# Patient Record
Sex: MALE | Race: WHITE | NOT HISPANIC OR LATINO | Employment: PART TIME | URBAN - METROPOLITAN AREA
[De-identification: names, ages, dates, MRNs, and addresses within clinical notes are randomized per-mention and may not be internally consistent; named-entity substitution may affect disease eponyms.]

---

## 2019-01-27 ENCOUNTER — HOSPITAL ENCOUNTER (EMERGENCY)
Facility: HOSPITAL | Age: 49
Discharge: PSYCHIATRIC OR SUBSTANCE USE FACILITY | End: 2019-01-27
Attending: EMERGENCY MEDICINE
Payer: COMMERCIAL

## 2019-01-27 ENCOUNTER — HOSPITAL ENCOUNTER (INPATIENT)
Facility: HOSPITAL | Age: 49
LOS: 7 days | Discharge: HOME | DRG: 885 | End: 2019-02-03
Attending: PSYCHIATRY & NEUROLOGY | Admitting: PSYCHIATRY & NEUROLOGY
Payer: COMMERCIAL

## 2019-01-27 VITALS
TEMPERATURE: 99 F | SYSTOLIC BLOOD PRESSURE: 143 MMHG | BODY MASS INDEX: 38.43 KG/M2 | RESPIRATION RATE: 18 BRPM | HEART RATE: 96 BPM | OXYGEN SATURATION: 100 % | WEIGHT: 275.56 LBS | DIASTOLIC BLOOD PRESSURE: 93 MMHG

## 2019-01-27 DIAGNOSIS — R45.851 DEPRESSION WITH SUICIDAL IDEATION: ICD-10-CM

## 2019-01-27 DIAGNOSIS — Z13.9 ENCOUNTER FOR MEDICAL SCREENING EXAMINATION: ICD-10-CM

## 2019-01-27 DIAGNOSIS — R45.851 SUICIDAL IDEATION: Primary | ICD-10-CM

## 2019-01-27 DIAGNOSIS — F33.2 SEVERE EPISODE OF RECURRENT MAJOR DEPRESSIVE DISORDER, WITHOUT PSYCHOTIC FEATURES: Primary | ICD-10-CM

## 2019-01-27 DIAGNOSIS — F32.A DEPRESSION WITH SUICIDAL IDEATION: ICD-10-CM

## 2019-01-27 PROBLEM — F43.10 PTSD (POST-TRAUMATIC STRESS DISORDER): Status: ACTIVE | Noted: 2019-01-27

## 2019-01-27 PROBLEM — F45.42 PAIN DISORDER ASSOCIATED WITH PSYCHOLOGICAL AND PHYSICAL FACTORS: Status: ACTIVE | Noted: 2019-01-27

## 2019-01-27 PROBLEM — I10 HTN (HYPERTENSION): Status: ACTIVE | Noted: 2019-01-27

## 2019-01-27 PROBLEM — E11.9 DM (DIABETES MELLITUS): Status: ACTIVE | Noted: 2019-01-27

## 2019-01-27 PROBLEM — G44.209 TENSION TYPE HEADACHE: Status: ACTIVE | Noted: 2019-01-27

## 2019-01-27 PROBLEM — E78.5 DYSLIPIDEMIA: Status: ACTIVE | Noted: 2019-01-27

## 2019-01-27 LAB
ALBUMIN SERPL BCP-MCNC: 4.5 G/DL
ALP SERPL-CCNC: 59 U/L
ALT SERPL W/O P-5'-P-CCNC: 37 U/L
AMPHET+METHAMPHET UR QL: NEGATIVE
ANION GAP SERPL CALC-SCNC: 13 MMOL/L
APAP SERPL-MCNC: <3 UG/ML
AST SERPL-CCNC: 26 U/L
BACTERIA #/AREA URNS HPF: NORMAL /HPF
BARBITURATES UR QL SCN>200 NG/ML: NEGATIVE
BASOPHILS # BLD AUTO: 0.02 K/UL
BASOPHILS NFR BLD: 0.3 %
BENZODIAZ UR QL SCN>200 NG/ML: NEGATIVE
BILIRUB SERPL-MCNC: 0.6 MG/DL
BILIRUB UR QL STRIP: NEGATIVE
BUN SERPL-MCNC: 13 MG/DL
BZE UR QL SCN: NEGATIVE
CALCIUM SERPL-MCNC: 10.3 MG/DL
CANNABINOIDS UR QL SCN: NORMAL
CHLORIDE SERPL-SCNC: 104 MMOL/L
CLARITY UR: CLEAR
CO2 SERPL-SCNC: 22 MMOL/L
COLOR UR: YELLOW
CREAT SERPL-MCNC: 0.9 MG/DL
CREAT UR-MCNC: 50.6 MG/DL
DIFFERENTIAL METHOD: ABNORMAL
EOSINOPHIL # BLD AUTO: 0.1 K/UL
EOSINOPHIL NFR BLD: 1.3 %
ERYTHROCYTE [DISTWIDTH] IN BLOOD BY AUTOMATED COUNT: 13.6 %
EST. GFR  (AFRICAN AMERICAN): >60 ML/MIN/1.73 M^2
EST. GFR  (NON AFRICAN AMERICAN): >60 ML/MIN/1.73 M^2
ETHANOL SERPL-MCNC: <10 MG/DL
GLUCOSE SERPL-MCNC: 150 MG/DL
GLUCOSE UR QL STRIP: ABNORMAL
HCT VFR BLD AUTO: 46.4 %
HGB BLD-MCNC: 15.9 G/DL
HGB UR QL STRIP: NEGATIVE
KETONES UR QL STRIP: NEGATIVE
LEUKOCYTE ESTERASE UR QL STRIP: NEGATIVE
LYMPHOCYTES # BLD AUTO: 1.3 K/UL
LYMPHOCYTES NFR BLD: 20.3 %
MCH RBC QN AUTO: 31.5 PG
MCHC RBC AUTO-ENTMCNC: 34.3 G/DL
MCV RBC AUTO: 92 FL
METHADONE UR QL SCN>300 NG/ML: NEGATIVE
MICROSCOPIC COMMENT: NORMAL
MONOCYTES # BLD AUTO: 0.5 K/UL
MONOCYTES NFR BLD: 7.8 %
NEUTROPHILS # BLD AUTO: 4.3 K/UL
NEUTROPHILS NFR BLD: 70.3 %
NITRITE UR QL STRIP: NEGATIVE
OPIATES UR QL SCN: NEGATIVE
PCP UR QL SCN>25 NG/ML: NEGATIVE
PH UR STRIP: 5 [PH] (ref 5–8)
PLATELET # BLD AUTO: 223 K/UL
PMV BLD AUTO: 10 FL
POCT GLUCOSE: 128 MG/DL (ref 70–110)
POTASSIUM SERPL-SCNC: 3.8 MMOL/L
PROT SERPL-MCNC: 7.8 G/DL
PROT UR QL STRIP: NEGATIVE
RBC # BLD AUTO: 5.05 M/UL
RBC #/AREA URNS HPF: 0 /HPF (ref 0–4)
SALICYLATES SERPL-MCNC: <5 MG/DL
SODIUM SERPL-SCNC: 139 MMOL/L
SP GR UR STRIP: <=1.005 (ref 1–1.03)
TOXICOLOGY INFORMATION: NORMAL
TSH SERPL DL<=0.005 MIU/L-ACNC: 1.79 UIU/ML
URN SPEC COLLECT METH UR: ABNORMAL
UROBILINOGEN UR STRIP-ACNC: NEGATIVE EU/DL
WBC # BLD AUTO: 6.15 K/UL
WBC #/AREA URNS HPF: 0 /HPF (ref 0–5)
YEAST URNS QL MICRO: NORMAL

## 2019-01-27 PROCEDURE — 80307 DRUG TEST PRSMV CHEM ANLYZR: CPT

## 2019-01-27 PROCEDURE — 85025 COMPLETE CBC W/AUTO DIFF WBC: CPT

## 2019-01-27 PROCEDURE — 81000 URINALYSIS NONAUTO W/SCOPE: CPT | Mod: 59

## 2019-01-27 PROCEDURE — 36415 COLL VENOUS BLD VENIPUNCTURE: CPT

## 2019-01-27 PROCEDURE — 80053 COMPREHEN METABOLIC PANEL: CPT

## 2019-01-27 PROCEDURE — 99285 EMERGENCY DEPT VISIT HI MDM: CPT | Mod: 25

## 2019-01-27 PROCEDURE — 99223 1ST HOSP IP/OBS HIGH 75: CPT | Mod: ,,, | Performed by: PSYCHIATRY & NEUROLOGY

## 2019-01-27 PROCEDURE — 25000003 PHARM REV CODE 250: Performed by: PSYCHIATRY & NEUROLOGY

## 2019-01-27 PROCEDURE — 99232 SBSQ HOSP IP/OBS MODERATE 35: CPT | Mod: ,,, | Performed by: FAMILY MEDICINE

## 2019-01-27 PROCEDURE — 11400000 HC PSYCH PRIVATE ROOM

## 2019-01-27 PROCEDURE — 83036 HEMOGLOBIN GLYCOSYLATED A1C: CPT

## 2019-01-27 PROCEDURE — 93005 ELECTROCARDIOGRAM TRACING: CPT

## 2019-01-27 PROCEDURE — 99232 PR SUBSEQUENT HOSPITAL CARE,LEVL II: ICD-10-PCS | Mod: ,,, | Performed by: FAMILY MEDICINE

## 2019-01-27 PROCEDURE — 93010 ELECTROCARDIOGRAM REPORT: CPT | Mod: ,,, | Performed by: INTERNAL MEDICINE

## 2019-01-27 PROCEDURE — 80061 LIPID PANEL: CPT

## 2019-01-27 PROCEDURE — 80329 ANALGESICS NON-OPIOID 1 OR 2: CPT

## 2019-01-27 PROCEDURE — 99223 PR INITIAL HOSPITAL CARE,LEVL III: ICD-10-PCS | Mod: ,,, | Performed by: PSYCHIATRY & NEUROLOGY

## 2019-01-27 PROCEDURE — 90833 PR PSYCHOTHERAPY W/PATIENT W/E&M, 30 MIN (ADD ON): ICD-10-PCS | Mod: ,,, | Performed by: PSYCHIATRY & NEUROLOGY

## 2019-01-27 PROCEDURE — 90833 PSYTX W PT W E/M 30 MIN: CPT | Mod: ,,, | Performed by: PSYCHIATRY & NEUROLOGY

## 2019-01-27 PROCEDURE — 84443 ASSAY THYROID STIM HORMONE: CPT

## 2019-01-27 PROCEDURE — 93010 EKG 12-LEAD: ICD-10-PCS | Mod: ,,, | Performed by: INTERNAL MEDICINE

## 2019-01-27 PROCEDURE — 80320 DRUG SCREEN QUANTALCOHOLS: CPT

## 2019-01-27 RX ORDER — METOPROLOL SUCCINATE 25 MG/1
100 TABLET, EXTENDED RELEASE ORAL DAILY
Status: DISCONTINUED | OUTPATIENT
Start: 2019-01-28 | End: 2019-02-03 | Stop reason: HOSPADM

## 2019-01-27 RX ORDER — HYDROCHLOROTHIAZIDE 25 MG/1
25 TABLET ORAL DAILY
Status: DISCONTINUED | OUTPATIENT
Start: 2019-01-28 | End: 2019-02-03 | Stop reason: HOSPADM

## 2019-01-27 RX ORDER — SERTRALINE HYDROCHLORIDE 25 MG/1
25 TABLET, FILM COATED ORAL DAILY
Status: DISCONTINUED | OUTPATIENT
Start: 2019-01-27 | End: 2019-01-28

## 2019-01-27 RX ORDER — MAG HYDROX/ALUMINUM HYD/SIMETH 200-200-20
30 SUSPENSION, ORAL (FINAL DOSE FORM) ORAL EVERY 6 HOURS PRN
Status: DISCONTINUED | OUTPATIENT
Start: 2019-01-27 | End: 2019-02-03 | Stop reason: HOSPADM

## 2019-01-27 RX ORDER — GABAPENTIN 100 MG/1
100 CAPSULE ORAL 2 TIMES DAILY
Status: DISCONTINUED | OUTPATIENT
Start: 2019-01-27 | End: 2019-01-28

## 2019-01-27 RX ORDER — LOPERAMIDE HYDROCHLORIDE 2 MG/1
2 CAPSULE ORAL
Status: DISCONTINUED | OUTPATIENT
Start: 2019-01-27 | End: 2019-02-03 | Stop reason: HOSPADM

## 2019-01-27 RX ORDER — ACETAMINOPHEN 325 MG/1
650 TABLET ORAL EVERY 6 HOURS PRN
Status: DISCONTINUED | OUTPATIENT
Start: 2019-01-27 | End: 2019-02-03 | Stop reason: HOSPADM

## 2019-01-27 RX ORDER — GLUCAGON 1 MG
1 KIT INJECTION
Status: DISCONTINUED | OUTPATIENT
Start: 2019-01-27 | End: 2019-02-03 | Stop reason: HOSPADM

## 2019-01-27 RX ORDER — NITROGLYCERIN 0.4 MG/1
0.4 TABLET SUBLINGUAL EVERY 5 MIN PRN
Status: DISCONTINUED | OUTPATIENT
Start: 2019-01-27 | End: 2019-02-03 | Stop reason: HOSPADM

## 2019-01-27 RX ORDER — IBUPROFEN 200 MG
24 TABLET ORAL
Status: DISCONTINUED | OUTPATIENT
Start: 2019-01-27 | End: 2019-02-03 | Stop reason: HOSPADM

## 2019-01-27 RX ORDER — INSULIN ASPART 100 [IU]/ML
0-5 INJECTION, SOLUTION INTRAVENOUS; SUBCUTANEOUS
Status: DISCONTINUED | OUTPATIENT
Start: 2019-01-27 | End: 2019-02-03 | Stop reason: HOSPADM

## 2019-01-27 RX ORDER — QUINAPRIL 10 MG/1
20 TABLET ORAL NIGHTLY
Status: DISCONTINUED | OUTPATIENT
Start: 2019-01-27 | End: 2019-02-03 | Stop reason: HOSPADM

## 2019-01-27 RX ORDER — METFORMIN HYDROCHLORIDE 500 MG/1
500 TABLET ORAL
Status: DISCONTINUED | OUTPATIENT
Start: 2019-01-28 | End: 2019-02-03 | Stop reason: HOSPADM

## 2019-01-27 RX ORDER — FOLIC ACID 1 MG/1
1 TABLET ORAL DAILY
Status: DISCONTINUED | OUTPATIENT
Start: 2019-01-27 | End: 2019-02-03 | Stop reason: HOSPADM

## 2019-01-27 RX ORDER — DOCUSATE SODIUM 100 MG/1
100 CAPSULE, LIQUID FILLED ORAL DAILY PRN
Status: DISCONTINUED | OUTPATIENT
Start: 2019-01-27 | End: 2019-02-03 | Stop reason: HOSPADM

## 2019-01-27 RX ORDER — FENOFIBRATE 160 MG/1
160 TABLET ORAL DAILY
Status: DISCONTINUED | OUTPATIENT
Start: 2019-01-28 | End: 2019-01-27 | Stop reason: SDUPTHER

## 2019-01-27 RX ORDER — OLANZAPINE 10 MG/1
10 TABLET ORAL EVERY 4 HOURS PRN
Status: DISCONTINUED | OUTPATIENT
Start: 2019-01-27 | End: 2019-02-03 | Stop reason: HOSPADM

## 2019-01-27 RX ORDER — ATORVASTATIN CALCIUM 20 MG/1
40 TABLET, FILM COATED ORAL DAILY
Status: DISCONTINUED | OUTPATIENT
Start: 2019-01-28 | End: 2019-02-03 | Stop reason: HOSPADM

## 2019-01-27 RX ORDER — GEMFIBROZIL 600 MG/1
600 TABLET, FILM COATED ORAL
Status: DISCONTINUED | OUTPATIENT
Start: 2019-01-27 | End: 2019-02-03 | Stop reason: HOSPADM

## 2019-01-27 RX ORDER — IBUPROFEN 200 MG
16 TABLET ORAL
Status: DISCONTINUED | OUTPATIENT
Start: 2019-01-27 | End: 2019-02-03 | Stop reason: HOSPADM

## 2019-01-27 RX ORDER — ASPIRIN 81 MG/1
81 TABLET ORAL DAILY
Status: DISCONTINUED | OUTPATIENT
Start: 2019-01-27 | End: 2019-02-03 | Stop reason: HOSPADM

## 2019-01-27 RX ORDER — QUETIAPINE FUMARATE 100 MG/1
100 TABLET, FILM COATED ORAL NIGHTLY
Status: DISCONTINUED | OUTPATIENT
Start: 2019-01-27 | End: 2019-01-28

## 2019-01-27 RX ORDER — OLANZAPINE 10 MG/2ML
10 INJECTION, POWDER, FOR SOLUTION INTRAMUSCULAR EVERY 4 HOURS PRN
Status: DISCONTINUED | OUTPATIENT
Start: 2019-01-27 | End: 2019-02-03 | Stop reason: HOSPADM

## 2019-01-27 RX ORDER — HYDROXYZINE PAMOATE 50 MG/1
50 CAPSULE ORAL EVERY 6 HOURS PRN
Status: DISCONTINUED | OUTPATIENT
Start: 2019-01-27 | End: 2019-02-03 | Stop reason: HOSPADM

## 2019-01-27 RX ADMIN — QUINAPRIL 20 MG: 10 TABLET ORAL at 08:01

## 2019-01-27 RX ADMIN — GEMFIBROZIL 600 MG: 600 TABLET ORAL at 04:01

## 2019-01-27 RX ADMIN — THERA TABS 1 TABLET: TAB at 01:01

## 2019-01-27 RX ADMIN — SERTRALINE HYDROCHLORIDE 25 MG: 25 TABLET ORAL at 01:01

## 2019-01-27 RX ADMIN — GABAPENTIN 100 MG: 100 CAPSULE ORAL at 08:01

## 2019-01-27 RX ADMIN — QUETIAPINE FUMARATE 100 MG: 100 TABLET ORAL at 08:01

## 2019-01-27 RX ADMIN — FOLIC ACID 1 MG: 1 TABLET ORAL at 01:01

## 2019-01-27 RX ADMIN — ASPIRIN 81 MG: 81 TABLET, COATED ORAL at 01:01
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
## 2019-01-27 NOTE — CONSULTS
[facility] Medicine  Consult Note    Patient Name: Terrance Rodríguez  MRN: [id number]  Admission Date: 1/27/2019  Hospital Length of Stay: 0 days  Attending Physician: Tereso Quinones MD   Primary Care Provider: Ras Steven MD           Patient information was obtained from patient and ER records.     Consults     History & Physical      SUBJECTIVE:     History of Present Illness:  Patient is a 48 y.o. male presents with depression and suicidality. He has known dm,htn and dyslipidemia - home meds ordered.  Admitted to [facility].    No past medical history other than psych. No complaints today.    PTA Medications   Medication Sig    atorvastatin (LIPITOR) 40 MG tablet Take 40 mg by mouth once daily.    fenofibrate micronized (LOFIBRA) 134 MG Cap Take 134 mg by mouth daily with breakfast.    gemfibrozil (LOPID) 600 MG tablet Take 600 mg by mouth 2 (two) times daily before meals.    hydrochlorothiazide (HYDRODIURIL) 25 MG tablet Take 25 mg by mouth once daily.    metformin (FORTAMET) 500 mg 24 hr tablet Take 500 mg by mouth daily with breakfast.    metoprolol succinate (TOPROL-XL) 100 MG 24 hr tablet Take 100 mg by mouth once daily.    quetiapine (SEROQUEL) 100 MG Tab Take by mouth.    quinapril (ACCUPRIL) 20 MG tablet Take 20 mg by mouth every evening.    [DISCONTINUED] duloxetine (CYMBALTA) 30 MG capsule Take 30 mg by mouth once daily.       Review of patient's allergies indicates:   Allergen Reactions    Morphine Itching       Past Medical History:   Diagnosis Date    Anxiety     Depression     Diabetes mellitus     Diabetes mellitus, type 2     High cholesterol     Hx of psychiatric care     Hypertension     Insomnia     MI (myocardial infarction)     Panic disorder     Psychiatric problem     PTSD (post-traumatic stress disorder)     Sleep difficulties     Status post fracture of femur     Suicide attempt     Therapy      Past Surgical History:   Procedure  Laterality Date    ORIF FEMUR FRACTURE  2017     Family History   Problem Relation Age of Onset    Diabetes Mother     Depression Mother     Diabetes Brother     Diabetes Maternal Grandmother     Diabetes Maternal Grandfather     Alcohol abuse Father     Depression Sister      Social History     Tobacco Use    Smoking status: Former Smoker     Last attempt to quit: 2016     Years since quittin.1    Smokeless tobacco: Never Used   Substance Use Topics    Alcohol use: Yes     Comment: INFREQUENT    Drug use: Yes     Types: Marijuana        Review of Systems:  Constitutional: no fever or chills  Respiratory: no cough or shorness of breath  Cardiovascular: no chest pain or palpitations  Gastrointestinal: no nausea or vomiting, no abdominal pain or change in bowel habits  Musculoskeletal: no arthralgias or myalgias  Psych: Depressed  Headache  OBJECTIVE:     Vital Signs (Most Recent)  Temp: 98.3 °F (36.8 °C) (19 1252)  Pulse: 64 (19 1252)  Resp: 18 (19 1252)  BP: (!) 166/93 (19 1252)    Physical Exam:  General: well developed, well nourished  Lungs:  clear to auscultation bilaterally and normal respiratory effort  Cardiovascular: Heart: regular rate and rhythm, S1, S2 normal, no murmur, click, rub or gallop. Chest Wall: no tenderness. Extremities: no cyanosis or edema, or clubbing. Pulses: 2+ and symmetric.  Abdomen/Rectal: Abdomen: soft, non-tender non-distented; bowel sounds normal; no masses,  no organomegaly. Rectal: not examined  Skin: Skin color, texture, turgor normal. No rashes or lesions  Musculoskeletal:normal gait  Psych: flatted affect and depressed mood.  Neuro: Cranial nerves:  CN II Visual fields full to confrontation.   CN III, IV, VI Pupils are equal, round, and reactive to light.  CN III: no palsy  Nystagmus: none   Diplopia: none  Ophthalmoparesis: none  CN V Facial sensation intact.   CN VII Facial expression full, symmetric.   CN VIII normal.   CN IX  normal.   CN X normal.   CN XI normal.   CN XII normal.        Laboratory  CBC:   Recent Labs   Lab 01/27/19  1103   WBC 6.15   RBC 5.05   HGB 15.9   HCT 46.4      MCV 92   MCH 31.5*   MCHC 34.3     CMP:   Recent Labs   Lab 01/27/19  1103   *   CALCIUM 10.3   ALBUMIN 4.5   PROT 7.8      K 3.8   CO2 22*      BUN 13   CREATININE 0.9   ALKPHOS 59   ALT 37   AST 26   BILITOT 0.6     Recent Labs   Lab 01/27/19  1046   COLORU Yellow   SPECGRAV <=1.005*   PHUR 5.0   PROTEINUA Negative   BACTERIA Rare   NITRITE Negative   LEUKOCYTESUR Negative   UROBILINOGEN Negative     TSH   Date Value Ref Range Status   01/27/2019 1.787 0.400 - 4.000 uIU/mL Final     No results found for this or any previous visit.  Alcohol, Medical, Serum   Date Value Ref Range Status   01/27/2019 <10 <10 mg/dL Final     Acetaminophen (Tylenol), Serum   Date Value Ref Range Status   01/27/2019 <3.0 (L) 10.0 - 20.0 ug/mL Final     Comment:     Toxic Levels:  Adults (4 hr post-ingestion).........>150 ug/mL  Adults (12 hr post-ingestion)........>40 ug/mL  Peds (2 hr post-ingestion, liquid)...>225 ug/mL       Results for orders placed or performed during the hospital encounter of 01/27/19   Salicylate level   Result Value Ref Range    Salicylate Lvl <5.0 (L) 15.0 - 30.0 mg/dL     Results for orders placed or performed during the hospital encounter of 01/27/19   Drug screen panel, emergency   Result Value Ref Range    Benzodiazepines Negative     Methadone metabolites Negative     Cocaine (Metab.) Negative     Opiate Scrn, Ur Negative     Barbiturate Screen, Ur Negative     Amphetamine Screen, Ur Negative     THC Presumptive Positive     Phencyclidine Negative     Creatinine, Random Ur 50.6 23.0 - 375.0 mg/dL    Toxicology Information SEE COMMENT      No results found for: PREGTESTUR    ASSESSMENT/PLAN:     Active Hospital Problems    Diagnosis  POA    *Severe episode of recurrent major depressive disorder, without psychotic  features [F33.2]  Yes    Depression with suicidal ideation [F32.9, R45.851]  Not Applicable    PTSD (post-traumatic stress disorder) [F43.10]  Yes    Pain disorder associated with psychological and physical factors [F45.42]  Yes    Tension type headache [G44.209]  Yes    DM (diabetes mellitus) [E11.9]  Yes    HTN (hypertension) [I10]  Yes    Dyslipidemia [E78.5]  Yes      Resolved Hospital Problems   No resolved problems to display.       Plan: Further orders for psych      Assessment/Plan:     * Severe episode of recurrent major depressive disorder, without psychotic features    Per psyche       Dyslipidemia    Cont on statin       HTN (hypertension)    On home meds for now, but will adjust meds as needed, if needed       DM (diabetes mellitus)    Will order SSI for accuchecks with meals       Tension type headache    May consider using fioricet       Pain disorder associated with psychological and physical factors    Chronic injury.       PTSD (post-traumatic stress disorder)    Per psyche       Depression with suicidal ideation    Orders per psyche         VTE Risk Mitigation (From admission, onward)    None              Thank you for your consult. I will sign off. Please contact us if you have any additional questions.    Saba Álvarez MD  Department of Hospital Medicine   [facility]

## 2019-01-28 LAB
CHOLEST SERPL-MCNC: 188 MG/DL
CHOLEST/HDLC SERPL: 6.3 {RATIO}
ESTIMATED AVG GLUCOSE: 160 MG/DL
HBA1C MFR BLD HPLC: 7.2 %
HDLC SERPL-MCNC: 30 MG/DL
HDLC SERPL: 16 %
LDLC SERPL CALC-MCNC: 100.2 MG/DL
NONHDLC SERPL-MCNC: 158 MG/DL
POCT GLUCOSE: 135 MG/DL (ref 70–110)
POCT GLUCOSE: 145 MG/DL (ref 70–110)
POCT GLUCOSE: 155 MG/DL (ref 70–110)
TRIGL SERPL-MCNC: 289 MG/DL

## 2019-01-28 PROCEDURE — 99233 PR SUBSEQUENT HOSPITAL CARE,LEVL III: ICD-10-PCS | Mod: ,,, | Performed by: PSYCHIATRY & NEUROLOGY

## 2019-01-28 PROCEDURE — 90471 IMMUNIZATION ADMIN: CPT | Performed by: PSYCHIATRY & NEUROLOGY

## 2019-01-28 PROCEDURE — 11400000 HC PSYCH PRIVATE ROOM

## 2019-01-28 PROCEDURE — 63600175 PHARM REV CODE 636 W HCPCS: Performed by: PSYCHIATRY & NEUROLOGY

## 2019-01-28 PROCEDURE — 25000003 PHARM REV CODE 250: Performed by: PSYCHIATRY & NEUROLOGY

## 2019-01-28 PROCEDURE — 90686 IIV4 VACC NO PRSV 0.5 ML IM: CPT | Performed by: PSYCHIATRY & NEUROLOGY

## 2019-01-28 PROCEDURE — 99233 SBSQ HOSP IP/OBS HIGH 50: CPT | Mod: ,,, | Performed by: PSYCHIATRY & NEUROLOGY

## 2019-01-28 PROCEDURE — 90833 PR PSYCHOTHERAPY W/PATIENT W/E&M, 30 MIN (ADD ON): ICD-10-PCS | Mod: ,,, | Performed by: PSYCHIATRY & NEUROLOGY

## 2019-01-28 PROCEDURE — 90833 PSYTX W PT W E/M 30 MIN: CPT | Mod: ,,, | Performed by: PSYCHIATRY & NEUROLOGY

## 2019-01-28 PROCEDURE — 36415 COLL VENOUS BLD VENIPUNCTURE: CPT

## 2019-01-28 RX ORDER — OSELTAMIVIR PHOSPHATE 75 MG/1
75 CAPSULE ORAL DAILY
Status: COMPLETED | OUTPATIENT
Start: 2019-01-28 | End: 2019-02-03

## 2019-01-28 RX ORDER — GABAPENTIN 100 MG/1
200 CAPSULE ORAL 2 TIMES DAILY
Status: DISCONTINUED | OUTPATIENT
Start: 2019-01-28 | End: 2019-01-29

## 2019-01-28 RX ORDER — PRAZOSIN HYDROCHLORIDE 1 MG/1
1 CAPSULE ORAL 2 TIMES DAILY
Status: DISCONTINUED | OUTPATIENT
Start: 2019-01-28 | End: 2019-01-30

## 2019-01-28 RX ORDER — SERTRALINE HYDROCHLORIDE 50 MG/1
50 TABLET, FILM COATED ORAL DAILY
Status: DISCONTINUED | OUTPATIENT
Start: 2019-01-29 | End: 2019-01-31

## 2019-01-28 RX ADMIN — THERA TABS 1 TABLET: TAB at 08:01

## 2019-01-28 RX ADMIN — GABAPENTIN 100 MG: 100 CAPSULE ORAL at 08:01

## 2019-01-28 RX ADMIN — OSELTAMIVIR PHOSPHATE 75 MG: 75 CAPSULE ORAL at 04:01

## 2019-01-28 RX ADMIN — METOPROLOL SUCCINATE 100 MG: 25 TABLET, EXTENDED RELEASE ORAL at 08:01

## 2019-01-28 RX ADMIN — PRAZOSIN HYDROCHLORIDE 1 MG: 1 CAPSULE ORAL at 08:01

## 2019-01-28 RX ADMIN — METFORMIN HYDROCHLORIDE 500 MG: 500 TABLET ORAL at 07:01

## 2019-01-28 RX ADMIN — PRAZOSIN HYDROCHLORIDE 1 MG: 1 CAPSULE ORAL at 10:01

## 2019-01-28 RX ADMIN — ASPIRIN 81 MG: 81 TABLET, COATED ORAL at 08:01

## 2019-01-28 RX ADMIN — SERTRALINE HYDROCHLORIDE 25 MG: 25 TABLET ORAL at 08:01

## 2019-01-28 RX ADMIN — INFLUENZA A VIRUS A/MICHIGAN/45/2015 X-275 (H1N1) ANTIGEN (FORMALDEHYDE INACTIVATED), INFLUENZA A VIRUS A/SINGAPORE/INFIMH-16-0019/2016 IVR-186 (H3N2) ANTIGEN (FORMALDEHYDE INACTIVATED), INFLUENZA B VIRUS B/PHUKET/3073/2013 ANTIGEN (FORMALDEHYDE INACTIVATED), AND INFLUENZA B VIRUS B/MARYLAND/15/2016 BX-69A ANTIGEN (FORMALDEHYDE INACTIVATED) 0.5 ML: 15; 15; 15; 15 INJECTION, SUSPENSION INTRAMUSCULAR at 05:01

## 2019-01-28 RX ADMIN — QUETIAPINE FUMARATE 150 MG: 100 TABLET ORAL at 08:01

## 2019-01-28 RX ADMIN — HYDROCHLOROTHIAZIDE 25 MG: 25 TABLET ORAL at 08:01

## 2019-01-28 RX ADMIN — GEMFIBROZIL 600 MG: 600 TABLET ORAL at 04:01

## 2019-01-28 RX ADMIN — QUINAPRIL 20 MG: 10 TABLET ORAL at 08:01

## 2019-01-28 RX ADMIN — FOLIC ACID 1 MG: 1 TABLET ORAL at 08:01

## 2019-01-28 RX ADMIN — ATORVASTATIN CALCIUM 40 MG: 20 TABLET, FILM COATED ORAL at 08:01

## 2019-01-28 RX ADMIN — GEMFIBROZIL 600 MG: 600 TABLET ORAL at 06:01

## 2019-01-28 RX ADMIN — GABAPENTIN 200 MG: 100 CAPSULE ORAL at 08:01

## 2019-01-28 NOTE — PROGRESS NOTES
"PSYCHIATRY DAILY INPATIENT PROGRESS NOTE  SUBSEQUENT HOSPITAL VISIT    ENCOUNTER DATE: 1/28/2019  SITE: [facility]    DATE OF ADMISSION: 1/27/2019 12:46 PM  LENGTH OF STAY: 1 days      HISTORY    CHIEF COMPLAINT   Terrance Rodríguez is a 48 y.o. male, seen during daily chiu rounds on the inpatient unit.  Terrance Rodríguez presents with the chief complaint of depression and SI, "I was going to shoot myself."      HPI   (Elements: Location, Quality, Severity, Duration, Timing, Content, Modifying Factors, Associated Signs & Symptoms)    The patient was seen and examined. The chart was reviewed.     Staff reports no behavioral or management issues.     The patient has been compliant with treatment. The patient denied any side effects.    Continued Symptoms of Depression: +diminished mood or loss of interest/anhedonia; +irritability, +diminished energy, +change in sleep, no change in appetite, +diminished concentration or cognition or indecisiveness, no PMA/R, +excessive guilt or hopelessness or worthlessness, +suicidal ideations     Continued Changes in Sleep: +trouble with initiation/maintenance, no early morning awakening with inability to return to sleep     Continued Suicidal/(no)Homicidal ideations: +active/passive ideations, +organized plans (shoot self), no future intentions     Denied past or current Symptoms of psychosis: no hallucinations, delusions, disorganized thinking, disorganized behavior or abnormal motor behavior, or negative symptoms      Denied past or current Symptoms of juani or hypomania: no no , expansive, or irritable mood with increased energy or activity; with no inflated self-esteem or grandiosity, decreased need for sleep, increased rate of speech, FOI or racing thoughts, distractibility, increased goal directed activity or PMA, or risky/disinhibited behavior     Continued Symptoms of EAMON: +excessive anxiety/worry/fear, +more days than not, +about numerous issues, +difficult to control, " with +restlessness, +fatigue, +poor concentration, +irritability, +muscle tension, +sleep disturbance; +causes functionally impairing distress      No current Symptoms of Panic Disorder: no panic attacks since admission; without agoraphobia     Continued Symptoms of PTSD: +h/o trauma; +re-experiencing/intrusive symptoms, +avoidant behavior, +negative alterations in cognition or mood, +hyperarousal symptoms; without dissociative symptoms       PSYCHOTHERAPY ADD-ON +[id number]   30 (16-37*) minutes    Time: 16 minutes  Participants: Met with patient    Therapeutic Intervention Type: behavior modifying psychotherapy, supportive psychotherapy  Why chosen therapy is appropriate versus another modality: relevant to diagnosis, patient responds to this modality, evidence based practice    Target symptoms: depression, anxiety   Primary focus: depression, psychosocial stressors  Psychotherapeutic techniques: supportive, behavioral and problem solving techniques; psycho-education    Outcome monitoring methods: self-report, observation    Patient's response to intervention:  The patient's response to intervention is accepting.    Progress toward goals:  The patient's progress toward goals is fair .          ROS  General ROS: negative  Ophthalmic ROS: negative  ENT ROS: negative  Allergy and Immunology ROS: negative  Hematological and Lymphatic ROS: negative  Endocrine ROS: negative  Respiratory ROS: no cough, shortness of breath, or wheezing  Cardiovascular ROS: no chest pain or dyspnea on exertion  Gastrointestinal ROS: no abdominal pain, change in bowel habits, or black or bloody stools  Genito-Urinary ROS: no dysuria, trouble voiding, or hematuria  Musculoskeletal ROS: negative  Neurological ROS: no TIA or stroke symptoms  Dermatological ROS: negative    PAST MEDICAL HISTORY   Past Medical History:   Diagnosis Date    Anxiety     Depression     Diabetes mellitus     Diabetes mellitus, type 2     High cholesterol     Hx of  "psychiatric care     Hypertension     Insomnia     MI (myocardial infarction)     Panic disorder     Psychiatric problem     PTSD (post-traumatic stress disorder)     Sleep difficulties     Status post fracture of femur     Suicide attempt     Therapy            PSYCHOTROPIC MEDICATIONS   Scheduled Meds:   aspirin  81 mg Oral Daily    atorvastatin  40 mg Oral Daily    folic acid  1 mg Oral Daily    gabapentin  100 mg Oral BID    gemfibrozil  600 mg Oral BID AC    hydroCHLOROthiazide  25 mg Oral Daily    metFORMIN  500 mg Oral Daily with breakfast    metoprolol succinate  100 mg Oral Daily    multivitamin  1 tablet Oral Daily    QUEtiapine  100 mg Oral QHS    quinapril  20 mg Oral QHS    sertraline  25 mg Oral Daily     Continuous Infusions:  PRN Meds:.acetaminophen, aluminum-magnesium hydroxide-simethicone, dextrose 50%, dextrose 50%, docusate sodium, glucagon (human recombinant), glucose, glucose, hydrOXYzine pamoate, insulin aspart U-100, loperamide, nitroGLYCERIN, OLANZapine **AND** OLANZapine        EXAMINATION    VITALS   Vitals:    01/28/19 0746   BP: (!) 146/78   Pulse: 67   Resp: 18   Temp: 97.3 °F (36.3 °C)     Body mass index is 39.39 kg/m².      CONSTITUTIONAL  General Appearance: WM, in casual garb, obese; NAD     MUSCULOSKELETAL  Muscle Strength and Tone:  normal  Abnormal Involuntary Movements:  none  Gait and Station:  non-ataxic but mild limp secondary to leg pain     PSYCHIATRIC   Level of Consciousness: awake, alert  Orientation: p/p/t/s  Grooming:  adequate to circumstances  Psychomotor Behavior: no PMA/R  Speech: nl r/t/v/s  Language:  English fluent  Mood: "the same"  Affect: dysphoric, anxious, decreased range, tearful  Thought Process:  linear and organized  Associations:  intact; no AVERY  Thought Content:  denied AVH/delusions; denied HI, +SI  Memory:  intact to recent and remote events  Attention:  intact to conversation; not distractible   Fund of Knowledge:  age and " education appropriate  Estimate if Intelligence:  average based on work/education history, vocabulary and mental status exam  Insight:  good- recognizes illness and need for tx/help  Judgment:  good- no bx issues, compliant and cooperative             DIAGNOSTIC TESTING   Laboratory Results  Recent Results (from the past 24 hour(s))   Drug screen panel, emergency    Collection Time: 01/27/19 10:46 AM   Result Value Ref Range    Benzodiazepines Negative     Methadone metabolites Negative     Cocaine (Metab.) Negative     Opiate Scrn, Ur Negative     Barbiturate Screen, Ur Negative     Amphetamine Screen, Ur Negative     THC Presumptive Positive     Phencyclidine Negative     Creatinine, Random Ur 50.6 23.0 - 375.0 mg/dL    Toxicology Information SEE COMMENT    Urinalysis    Collection Time: 01/27/19 10:46 AM   Result Value Ref Range    Specimen UA Urine, Clean Catch     Color, UA Yellow Yellow, Straw, Salima    Appearance, UA Clear Clear    pH, UA 5.0 5.0 - 8.0    Specific Gravity, UA <=1.005 (A) 1.005 - 1.030    Protein, UA Negative Negative    Glucose, UA 3+ (A) Negative    Ketones, UA Negative Negative    Bilirubin (UA) Negative Negative    Occult Blood UA Negative Negative    Nitrite, UA Negative Negative    Urobilinogen, UA Negative <2.0 EU/dL    Leukocytes, UA Negative Negative   Urinalysis Microscopic    Collection Time: 01/27/19 10:46 AM   Result Value Ref Range    RBC, UA 0 0 - 4 /hpf    WBC, UA 0 0 - 5 /hpf    Bacteria, UA Rare None-Occ /hpf    Yeast, UA None None    Microscopic Comment SEE COMMENT    CBC auto differential    Collection Time: 01/27/19 11:03 AM   Result Value Ref Range    WBC 6.15 3.90 - 12.70 K/uL    RBC 5.05 4.60 - 6.20 M/uL    Hemoglobin 15.9 14.0 - 18.0 g/dL    Hematocrit 46.4 40.0 - 54.0 %    MCV 92 82 - 98 fL    MCH 31.5 (H) 27.0 - 31.0 pg    MCHC 34.3 32.0 - 36.0 g/dL    RDW 13.6 11.5 - 14.5 %    Platelets 223 150 - 350 K/uL    MPV 10.0 9.2 - 12.9 fL    Gran # (ANC) 4.3 1.8 - 7.7 K/uL     Lymph # 1.3 1.0 - 4.8 K/uL    Mono # 0.5 0.3 - 1.0 K/uL    Eos # 0.1 0.0 - 0.5 K/uL    Baso # 0.02 0.00 - 0.20 K/uL    Gran% 70.3 38.0 - 73.0 %    Lymph% 20.3 18.0 - 48.0 %    Mono% 7.8 4.0 - 15.0 %    Eosinophil% 1.3 0.0 - 8.0 %    Basophil% 0.3 0.0 - 1.9 %    Differential Method Automated    Comprehensive metabolic panel    Collection Time: 01/27/19 11:03 AM   Result Value Ref Range    Sodium 139 136 - 145 mmol/L    Potassium 3.8 3.5 - 5.1 mmol/L    Chloride 104 95 - 110 mmol/L    CO2 22 (L) 23 - 29 mmol/L    Glucose 150 (H) 70 - 110 mg/dL    BUN, Bld 13 6 - 20 mg/dL    Creatinine 0.9 0.5 - 1.4 mg/dL    Calcium 10.3 8.7 - 10.5 mg/dL    Total Protein 7.8 6.0 - 8.4 g/dL    Albumin 4.5 3.5 - 5.2 g/dL    Total Bilirubin 0.6 0.1 - 1.0 mg/dL    Alkaline Phosphatase 59 55 - 135 U/L    AST 26 10 - 40 U/L    ALT 37 10 - 44 U/L    Anion Gap 13 8 - 16 mmol/L    eGFR if African American >60 >60 mL/min/1.73 m^2    eGFR if non African American >60 >60 mL/min/1.73 m^2   Acetaminophen level    Collection Time: 01/27/19 11:03 AM   Result Value Ref Range    Acetaminophen (Tylenol), Serum <3.0 (L) 10.0 - 20.0 ug/mL   Salicylate level    Collection Time: 01/27/19 11:03 AM   Result Value Ref Range    Salicylate Lvl <5.0 (L) 15.0 - 30.0 mg/dL   Ethanol    Collection Time: 01/27/19 11:03 AM   Result Value Ref Range    Alcohol, Medical, Serum <10 <10 mg/dL   TSH    Collection Time: 01/27/19 11:03 AM   Result Value Ref Range    TSH 1.787 0.400 - 4.000 uIU/mL   Lipid panel    Collection Time: 01/27/19 11:03 AM   Result Value Ref Range    Cholesterol 188 120 - 199 mg/dL    Triglycerides 289 (H) 30 - 150 mg/dL    HDL 30 (L) 40 - 75 mg/dL    LDL Cholesterol 100.2 63.0 - 159.0 mg/dL    HDL/Chol Ratio 16.0 (L) 20.0 - 50.0 %    Total Cholesterol/HDL Ratio 6.3 (H) 2.0 - 5.0    Non-HDL Cholesterol 158 mg/dL   Hemoglobin A1c    Collection Time: 01/27/19 11:03 AM   Result Value Ref Range    Hemoglobin A1C 7.2 (H) 4.0 - 5.6 %    Estimated Avg  Glucose 160 (H) 68 - 131 mg/dL   POCT glucose    Collection Time: 01/27/19  4:18 PM   Result Value Ref Range    POCT Glucose 128 (H) 70 - 110 mg/dL   POCT glucose    Collection Time: 01/28/19  7:54 AM   Result Value Ref Range    POCT Glucose 135 (H) 70 - 110 mg/dL         ASSESSMENT      IMPRESSION   MDD, recurrent, severe without psychotic features  EAMON  PTSD  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  Pain Disorder with physiological and psychological factors     Psychosocial stressors     NIDDM  Essential HTN  HLD  CAD        MEDICAL DECISION MAKING          PROBLEM LIST AND MANAGEMENT PLANS;PRESCRIPTION DRUG MANAGEMENT  Compliance: yes  Side Effects: no  Regimen Adjustments:     Depression/Anxiety: Stop home meds of cymbalta (pt reports no efficacy); Increase Adjunctive Seroquel to 150 mg po q HS (off-label); Increase zoloft to 50 mg po q day     PTSD: zoloft; seroquel off-label; start trial of prazosin 1 mg po BID     Insomnia: seroquel off-label     Pain: Increase gabapentin to 200 mg po BID     Psychosocial stressors: pt counseled; SW assisting with resources; refer for couple's therapy; refer for anger management; refer to [employer] for employment help; refer for disability assitance     NIDDM: continue home med of Metformin 500 mg po q day; FM consulted     Essential HTN: Continue home med of HCTZ 25 mg po q day, Metoprolol  mg po q day, quinapril 20 mg po q night; ; FM consulted     HLD: continue home med of Lipitor 40 mg po q day , gemfibrozil 600 mg po BID; FM consulted     CAD: continue Aspirin 81 mg po q day; nitro prn; FM consulted        DIAGNOSTIC TESTING  Labs reviewed; follow up pending labs     Disposition:  -SW to assist with aftercare planning and collateral  -Once stable discharge home with outpatient follow up care and/or rehab  -Continue inpatient treatment under a PEC and/or CEC for danger to self as evident by depression with SI requiring inpatient psychiatric stabilization.         NEED FOR CONTINUED HOSPITALIZATION  Psychiatric illness continues to pose a potential threat to life or bodily function, of self or others, thereby requiring the need for continued inpatient psychiatric hospitalization: Yes    Protective inpatient pyschiatric hospitalization required while a safe disposition plan is enacted: Yes    Patient stabilized and ready for discharge from inpatient psychiatric unit: No      STAFF:   Tereso Quinones MD  Psychiatry

## 2019-01-28 NOTE — PSYCH
Pt informed of exposure to flu on unit and offered flu vaccine and/or tamiflu.  Pt states he would like both.  Orders in place.  First dose of tamiflu given and flu shot administered.
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
## 2019-01-28 NOTE — PLAN OF CARE
Problem: Adult Behavioral Health Plan of Care  Goal: Plan of Care Review  Outcome: Ongoing (interventions implemented as appropriate)  Plan of care reviewed.  Denies intent to harm self or others at this time but admits to SI being the reason why he is here.  Accepts snacks and medications.  Gait steady, no falls.  Out on the unit but to himself. Promoted an individualized safety plan, reality-based interactions, effective coping strategies, and impulse control.  Will continue to monitor for safety.         Problem: Cognitive Impairment (Depressive Signs/Symptoms)  Goal: Improved Ability to Think and Concentrate (Depressive Signs/Symptoms)    Intervention: Support and Promote Cognitive Ability  Accepting meds as ordered by MD.

## 2019-01-28 NOTE — PROGRESS NOTES
"   01/28/19 1040   [facility] Group Therapy   Group Name Leisure Skills Training   Specific Interventions Cognitive Stimulation Training   Participation Level Active;Sharing   Participation Quality Cooperative;Social   Insight/Motivation Good   Affect/Mood Display Appropriate   Cognition Alert   Psychomotor WNL   Patient sat at a table by himself playing the card game [employer]mify. Patient also engaged with peers during the group activity. Patient states "I like brain games, brain games stimulates my interest. I have to exercise my brain like my body. It keeps me going and helps pass time."  "

## 2019-01-28 NOTE — PLAN OF CARE
Problem: Adult Behavioral Health Plan of Care  Goal: Plan of Care Review  Outcome: Ongoing (interventions implemented as appropriate)  Lying quiet in bed, eyes closed, respiration even and unlabored, appearing asleep.  Slept well all shift.  Safety and precautions maintained with rounds every 15 minutes, bed is fixed in a low position and pathways kept clear.  No fall occurred.

## 2019-01-28 NOTE — PLAN OF CARE
Problem: Adult Behavioral Health Plan of Care  Goal: Plan of Care Review  Outcome: Ongoing (interventions implemented as appropriate)  Pt is cooperative.  Tearful and sad during treatment team.  Pt still reports having some thoughts of harming self but no actual intent.  Pt reports doing away with his guns before going home and is able to contract for safety here.  Pt is depressed and somewhat withdrawn for others but does not isolate in his room.  Encouraged pt to continue taking all meds and to reports any issues to staff, pt verbalized understanding.  No acute distress apparent at this time, will continue to monitor.

## 2019-01-29 LAB
POCT GLUCOSE: 130 MG/DL (ref 70–110)
POCT GLUCOSE: 144 MG/DL (ref 70–110)
POCT GLUCOSE: 154 MG/DL (ref 70–110)

## 2019-01-29 PROCEDURE — 99233 SBSQ HOSP IP/OBS HIGH 50: CPT | Mod: ,,, | Performed by: PSYCHIATRY & NEUROLOGY

## 2019-01-29 PROCEDURE — 11400000 HC PSYCH PRIVATE ROOM

## 2019-01-29 PROCEDURE — 90833 PR PSYCHOTHERAPY W/PATIENT W/E&M, 30 MIN (ADD ON): ICD-10-PCS | Mod: ,,, | Performed by: PSYCHIATRY & NEUROLOGY

## 2019-01-29 PROCEDURE — 99233 PR SUBSEQUENT HOSPITAL CARE,LEVL III: ICD-10-PCS | Mod: ,,, | Performed by: PSYCHIATRY & NEUROLOGY

## 2019-01-29 PROCEDURE — 25000003 PHARM REV CODE 250: Performed by: PSYCHIATRY & NEUROLOGY

## 2019-01-29 PROCEDURE — 90833 PSYTX W PT W E/M 30 MIN: CPT | Mod: ,,, | Performed by: PSYCHIATRY & NEUROLOGY

## 2019-01-29 RX ORDER — GABAPENTIN 300 MG/1
300 CAPSULE ORAL 2 TIMES DAILY
Status: DISCONTINUED | OUTPATIENT
Start: 2019-01-29 | End: 2019-01-30

## 2019-01-29 RX ADMIN — ASPIRIN 81 MG: 81 TABLET, COATED ORAL at 08:01

## 2019-01-29 RX ADMIN — GABAPENTIN 200 MG: 100 CAPSULE ORAL at 08:01

## 2019-01-29 RX ADMIN — THERA TABS 1 TABLET: TAB at 08:01

## 2019-01-29 RX ADMIN — ACETAMINOPHEN 650 MG: 325 TABLET ORAL at 02:01

## 2019-01-29 RX ADMIN — METFORMIN HYDROCHLORIDE 500 MG: 500 TABLET ORAL at 08:01

## 2019-01-29 RX ADMIN — ATORVASTATIN CALCIUM 40 MG: 20 TABLET, FILM COATED ORAL at 08:01

## 2019-01-29 RX ADMIN — FOLIC ACID 1 MG: 1 TABLET ORAL at 08:01

## 2019-01-29 RX ADMIN — SERTRALINE HYDROCHLORIDE 50 MG: 50 TABLET ORAL at 08:01

## 2019-01-29 RX ADMIN — QUINAPRIL 20 MG: 10 TABLET ORAL at 08:01

## 2019-01-29 RX ADMIN — GEMFIBROZIL 600 MG: 600 TABLET ORAL at 04:01

## 2019-01-29 RX ADMIN — PRAZOSIN HYDROCHLORIDE 1 MG: 1 CAPSULE ORAL at 08:01

## 2019-01-29 RX ADMIN — METOPROLOL SUCCINATE 100 MG: 25 TABLET, EXTENDED RELEASE ORAL at 08:01

## 2019-01-29 RX ADMIN — QUETIAPINE FUMARATE 150 MG: 100 TABLET ORAL at 08:01

## 2019-01-29 RX ADMIN — HYDROCHLOROTHIAZIDE 25 MG: 25 TABLET ORAL at 08:01

## 2019-01-29 RX ADMIN — GEMFIBROZIL 600 MG: 600 TABLET ORAL at 06:01

## 2019-01-29 RX ADMIN — OSELTAMIVIR PHOSPHATE 75 MG: 75 CAPSULE ORAL at 08:01

## 2019-01-29 RX ADMIN — GABAPENTIN 300 MG: 300 CAPSULE ORAL at 08:01

## 2019-01-29 NOTE — PLAN OF CARE
Problem: Adult Behavioral Health Plan of Care  Goal: Plan of Care Review  Outcome: Ongoing (interventions implemented as appropriate)  Pt is calm and cooperative.  Pt does not report feeling dizzy or faint anymore.  Bp had come up this morning to 121/70.  No complaints at this time.  Pt denies any current plan to harm self but still has fleeting thoughts of not wanting to be here.  Able to contract for safety.  Pt participating in activity group at this time.  Encouraged to report any complaints to staff immediately, understanding verbalized.  No acute distress apparent at this time, will continue to monitor.

## 2019-01-29 NOTE — PLAN OF CARE
Problem: Adult Behavioral Health Plan of Care  Goal: Optimized Coping Skills in Response to Life Stressors    Intervention: Promote Effective Coping Strategies  Consent obtained for the patient's wife, Albertina Rodríguez. She was at work and unavailable to take the call. Will re-attempt.

## 2019-01-29 NOTE — PROGRESS NOTES
"PSYCHIATRY DAILY INPATIENT PROGRESS NOTE  SUBSEQUENT HOSPITAL VISIT    ENCOUNTER DATE: 1/29/2019  SITE: [facility]    DATE OF ADMISSION: 1/27/2019 12:46 PM  LENGTH OF STAY: 2 days      HISTORY    CHIEF COMPLAINT   Terrance Rodríguez is a 48 y.o. male, seen during daily chiu rounds on the inpatient unit.  Terrance Rodríguez presents with the chief complaint of depression and SI, "I was going to shoot myself."      HPI   (Elements: Location, Quality, Severity, Duration, Timing, Content, Modifying Factors, Associated Signs & Symptoms)    The patient was seen and examined. The chart was reviewed. Reviewed notes by the LPN, RNs, LPC, and CTRS from the last 24 hours.      Staff reports no behavioral or management issues.     The patient has been compliant with treatment. The patient denied any side effects.    He did fall when using the bathroom last night- he hit his shoulder on the door; he did not hit his head. He denied any current pain from it (chronic pain).      Overall, he reports minimal changes since admission, aside from decreased SI (less frequent and less intense) and better sleep.     Continued Symptoms of Depression: +diminished mood or loss of interest/anhedonia; +irritability, +diminished energy, +change in sleep, no change in appetite, +diminished concentration or cognition or indecisiveness, no PMA/R, +excessive guilt or hopelessness or worthlessness, less suicidal ideations     Continued but less Changes in Sleep: less trouble with initiation/maintenance, no early morning awakening with inability to return to sleep; slept 6 hours last night     Continued Suicidal/(no)Homicidal ideations: +active/passive ideations, +organized plans (shoot self), no future intentions     Denied past or current Symptoms of psychosis: no hallucinations, delusions, disorganized thinking, disorganized behavior or abnormal motor behavior, or negative symptoms      Denied past or current Symptoms of juani or hypomania: no " no , expansive, or irritable mood with increased energy or activity; with no inflated self-esteem or grandiosity, decreased need for sleep, increased rate of speech, FOI or racing thoughts, distractibility, increased goal directed activity or PMA, or risky/disinhibited behavior     Continued Symptoms of EAMON: +excessive anxiety/worry/fear, +more days than not, +about numerous issues, +difficult to control, with +restlessness, +fatigue, +poor concentration, +irritability, +muscle tension, +sleep disturbance; +causes functionally impairing distress      No current Symptoms of Panic Disorder: no panic attacks since admission; without agoraphobia     Continued Symptoms of PTSD: +h/o trauma; +re-experiencing/intrusive symptoms, +avoidant behavior, +negative alterations in cognition or mood, +hyperarousal symptoms; without dissociative symptoms       PSYCHOTHERAPY ADD-ON +27048   30 (16-37*) minutes    Time: 18 minutes  Participants: Met with patient    Therapeutic Intervention Type: behavior modifying psychotherapy, supportive psychotherapy  Why chosen therapy is appropriate versus another modality: relevant to diagnosis, patient responds to this modality, evidence based practice    Target symptoms: depression, anxiety   Primary focus: depression, psychosocial stressors  Psychotherapeutic techniques: supportive, behavioral and problem solving techniques; psycho-education; relaxation techniques    Outcome monitoring methods: self-report, observation    Patient's response to intervention:  The patient's response to intervention is accepting.    Progress toward goals:  The patient's progress toward goals is fair .          ROS  General ROS: negative  Ophthalmic ROS: negative  ENT ROS: negative  Allergy and Immunology ROS: negative  Hematological and Lymphatic ROS: negative  Endocrine ROS: negative  Respiratory ROS: no cough, shortness of breath, or wheezing  Cardiovascular ROS: no chest pain or dyspnea on  exertion  Gastrointestinal ROS: no abdominal pain, change in bowel habits, or black or bloody stools  Genito-Urinary ROS: no dysuria, trouble voiding, or hematuria  Musculoskeletal ROS: negative  Neurological ROS: no TIA or stroke symptoms  Dermatological ROS: negative    PAST MEDICAL HISTORY   Past Medical History:   Diagnosis Date    Anxiety     Depression     Diabetes mellitus     Diabetes mellitus, type 2     High cholesterol     Hx of psychiatric care     Hypertension     Insomnia     MI (myocardial infarction)     Panic disorder     Psychiatric problem     PTSD (post-traumatic stress disorder)     Sleep difficulties     Status post fracture of femur     Suicide attempt     Therapy            PSYCHOTROPIC MEDICATIONS   Scheduled Meds:   aspirin  81 mg Oral Daily    atorvastatin  40 mg Oral Daily    folic acid  1 mg Oral Daily    gabapentin  200 mg Oral BID    gemfibrozil  600 mg Oral BID AC    hydroCHLOROthiazide  25 mg Oral Daily    metFORMIN  500 mg Oral Daily with breakfast    metoprolol succinate  100 mg Oral Daily    multivitamin  1 tablet Oral Daily    oseltamivir  75 mg Oral Daily    prazosin  1 mg Oral BID    QUEtiapine  150 mg Oral QHS    quinapril  20 mg Oral QHS    sertraline  50 mg Oral Daily     Continuous Infusions:  PRN Meds:.acetaminophen, aluminum-magnesium hydroxide-simethicone, dextrose 50%, dextrose 50%, docusate sodium, glucagon (human recombinant), glucose, glucose, hydrOXYzine pamoate, insulin aspart U-100, loperamide, nitroGLYCERIN, OLANZapine **AND** OLANZapine        EXAMINATION    VITALS   Vitals:    01/29/19 0748   BP: 121/70   Pulse: 65   Resp: 18   Temp: 97.3 °F (36.3 °C)     Body mass index is 39.39 kg/m².      CONSTITUTIONAL  General Appearance: WM, in casual garb, obese; NAD     MUSCULOSKELETAL  Muscle Strength and Tone:  normal  Abnormal Involuntary Movements:  none  Gait and Station:  non-ataxic but mild limp secondary to leg pain     PSYCHIATRIC  "  Level of Consciousness: awake, alert  Orientation: p/p/t/s  Grooming:  adequate to circumstances  Psychomotor Behavior: no PMA/R  Speech: nl r/t/v/s  Language:  English fluent  Mood: "about the same"  Affect: dysphoric, anxious, decreased range, less tearful  Thought Process:  linear and organized  Associations:  intact; no AVERY  Thought Content:  denied AVH/delusions; denied HI, +SI  Memory:  intact to recent and remote events  Attention:  intact to conversation; not distractible   Fund of Knowledge:  age and education appropriate  Estimate if Intelligence:  average based on work/education history, vocabulary and mental status exam  Insight:  good- recognizes illness and need for tx/help  Judgment:  good- no bx issues, compliant and cooperative       DIAGNOSTIC TESTING   Laboratory Results  Recent Results (from the past 24 hour(s))   POCT glucose    Collection Time: 01/28/19  4:45 PM   Result Value Ref Range    POCT Glucose 145 (H) 70 - 110 mg/dL   POCT glucose    Collection Time: 01/28/19  7:45 PM   Result Value Ref Range    POCT Glucose 155 (H) 70 - 110 mg/dL   POCT glucose    Collection Time: 01/29/19  2:20 AM   Result Value Ref Range    POCT Glucose 154 (H) 70 - 110 mg/dL   POCT glucose    Collection Time: 01/29/19  7:43 AM   Result Value Ref Range    POCT Glucose 144 (H) 70 - 110 mg/dL         ASSESSMENT      IMPRESSION   MDD, recurrent, severe without psychotic features  EAMON  PTSD  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  Pain Disorder with physiological and psychological factors     Psychosocial stressors     NIDDM  Essential HTN  HLD  CAD        MEDICAL DECISION MAKING          PROBLEM LIST AND MANAGEMENT PLANS;PRESCRIPTION DRUG MANAGEMENT  Compliance: yes  Side Effects: no  Regimen Adjustments:     Depression/Anxiety: Stop home meds of cymbalta (pt reports no efficacy); Continue Adjunctive Seroquel to 150 mg po q HS (off-label); Increased zoloft to 50 mg po q day     PTSD: zoloft; seroquel off-label; " continue trial of prazosin 1 mg po BID     Insomnia: seroquel off-label     Pain: Increase gabapentin to 300 mg po BID     Psychosocial stressors: pt counseled; SW assisting with resources; refer for couple's therapy; refer for anger management; refer to [place] [employer] for employment help; refer for disability assitance     NIDDM: continue home med of Metformin 500 mg po q day; FM consulted     Essential HTN: Continue home med of HCTZ 25 mg po q day, Metoprolol  mg po q day, quinapril 20 mg po q night; ; FM consulted     HLD: continue home med of Lipitor 40 mg po q day , gemfibrozil 600 mg po BID; FM consulted     CAD: continue Aspirin 81 mg po q day; nitro prn; FM consulted        DIAGNOSTIC TESTING  Labs reviewed; follow up pending labs     Disposition:  -SW to assist with aftercare planning and collateral  -Once stable discharge home with outpatient follow up care and/or rehab  -Continue inpatient treatment under a PEC and/or CEC for danger to self as evident by depression with SI requiring inpatient psychiatric stabilization.        NEED FOR CONTINUED HOSPITALIZATION  Psychiatric illness continues to pose a potential threat to life or bodily function, of self or others, thereby requiring the need for continued inpatient psychiatric hospitalization: Yes    Protective inpatient pyschiatric hospitalization required while a safe disposition plan is enacted: Yes    Patient stabilized and ready for discharge from inpatient psychiatric unit: No      STAFF:   Tereso Quinones MD  Psychiatry

## 2019-01-29 NOTE — ASSESSMENT & PLAN NOTE
Contributing Nutrition Diagnosis  Food and nutrition related altered lab values    Related to (etiology):   DM2    Signs and Symptoms (as evidenced by):   POCT Gluc 128-154, HgA1c 7.2- pt on Regular diet     Interventions/Recommendations (treatment strategy):  Diabetic diet     Nutrition Diagnosis Status:   New

## 2019-01-29 NOTE — CONSULTS
"  [facility]  Adult Nutrition  Consult Note    SUMMARY     Recommendations    1. Change diet order to 2000cal Diabetic, as pt is a diabetic with POCT Gluc 128-154.   2. If meal intake declines to <50% x 3days, order Boost Glucose with meals.     RD following   Goals: 1. Meet % EEN/EPN until discharge               2. Maintain optimal BG levels with compliance to diet.   Nutrition Goal Status: new  Communication of RD Recs: (POC)    Reason for Assessment    Reason For Assessment: consult  Diagnosis: psychological disorder  Relevant Medical History: major depressive disorder, DM2, HLD, MI    General Information Comments: Pt admitted to [facility] s/p SI. No weight or appetite changes PTA reported. Eating % of meals. NFPE not warranted or appropriate 2/2 psych status.     Discharge Planning: Diabetic diet     Nutrition Risk Screen    Nutrition Risk Screen: no indicators present    Nutrition/Diet History    Patient Reported Diet/Restrictions/Preferences: diabetic diet, general  Spiritual, Cultural Beliefs, Alevism Practices, Values that Affect Care: no  Food Allergies: NKFA  Factors Affecting Nutritional Intake: None identified at this time    Anthropometrics    Temp: 97.4 °F (36.3 °C)  Height Method: Measured  Height: 5' 11" (180.3 cm)  Height (inches): 71 in  Weight Method: Standard Scale  Weight: 128.1 kg (282 lb 6.6 oz)  Weight (lb): 282.41 lb  Ideal Body Weight (IBW), Male: 172 lb  % Ideal Body Weight, Male (lb): 164.19 lb  BMI (Calculated): 39.5  BMI Grade: 35 - 39.9 - obesity - grade II       Lab/Procedures/Meds    Pertinent Labs Reviewed: reviewed  Pertinent Labs Comments: A1c 7.2, POCT Gluc 128-154  Pertinent Medications Reviewed: reviewed  Pertinent Medications Comments: statin, folic acid, MVI, metformin    Estimated/Assessed Needs    Weight Used For Calorie Calculations: 128.1 kg (282 lb 6.6 oz)  Energy Calorie Requirements (kcal): 2168  Energy Need Method: Analy-St Aggarwal(No " PAL)  Protein Requirements: 102g (0.8g/kg)  Weight Used For Protein Calculations: 128.1 kg (282 lb 6.6 oz)  Estimated Fluid Requirement Method: (1mL/kcal or per MD)  RDA Method (mL): 2168  CHO Requirement: 245g(~45%)      Nutrition Prescription Ordered    Current Diet Order: Regular     Evaluation of Received Nutrient/Fluid Intake       % Intake of Estimated Energy Needs: 75 - 100 %  % Meal Intake: 75 - 100 %    Nutrition Risk    Level of Risk/Frequency of Follow-up: low     Assessment and Plan    DM (diabetes mellitus)    Contributing Nutrition Diagnosis  Food and nutrition related altered lab values    Related to (etiology):   DM2    Signs and Symptoms (as evidenced by):   POCT Gluc 128-154, HgA1c 7.2- pt on Regular diet     Interventions/Recommendations (treatment strategy):  Diabetic diet     Nutrition Diagnosis Status:   New            Monitor and Evaluation    Food and Nutrient Intake: energy intake, food and beverage intake  Food and Nutrient Adminstration: diet order  Anthropometric Measurements: weight, weight change, body mass index  Biochemical Data, Medical Tests and Procedures: electrolyte and renal panel, inflammatory profile, lipid profile, gastrointestinal profile, glucose/endocrine profile       Nutrition Follow-Up    RD Follow-up?: Yes

## 2019-01-29 NOTE — PLAN OF CARE
"Problem: Adult Behavioral Health Plan of Care  Goal: Optimized Coping Skills in Response to Life Stressors    Intervention: Promote Effective Coping Strategies  Group Note:    Behavior:   The patient attended group and participated. He continued with depressed mood and flat affect.    Intervention:  The counselor implemented a brief CBT based group focused on identifying protective factors for mental health, including an assessment of the domains of social support, coping skills, physical health, sense of purpose, self-esteem, and healthy thinking. Patients were encouraged to set a goal to improve at least one domain.    Response:  He said, "I feel like I'm bad in all these areas." He was encouraged to think of personal strengths, but could not. He said, "I think a healthy marriage would help."     Plan:  Continue to encourage the patient to participate in groups and/or individual sessions.                          "

## 2019-01-29 NOTE — PLAN OF CARE
Problem: Adult Behavioral Health Plan of Care  Goal: Plan of Care Review  Outcome: Ongoing (interventions implemented as appropriate)  Pt asleep at the present time in assigned bed.  NAD.  Resp even & unlabored.  All precautions maintained.  Safety precautions ongoing.

## 2019-01-29 NOTE — PLAN OF CARE
Problem: Adult Behavioral Health Plan of Care  Goal: Plan of Care Review  Outcome: Ongoing (interventions implemented as appropriate)  Plan of care reviewed with patient, patient agrees with plan. Denies suicidal ideations and homicidal ideations. Accepts meals, snacks and medication. Gait steady, no falls. Clear pathways and clutter-free environment maintained. Affect remains flat. Calm and cooperative. Reviewed an individualized safety plan, effective coping skills, diabetes care, resolution of depression and mood improvement. Will continue to monitor for safety.

## 2019-01-29 NOTE — PLAN OF CARE
Problem: Adult Behavioral Health Plan of Care  Goal: Plan of Care Review  Recommendations    1. Change diet order to 2000cal Diabetic, as pt is a diabetic with POCT Gluc 128-154.   2. If meal intake declines to <50% x 3days, order Boost Glucose with meals.     RD following   Goals: 1. Meet % EEN/EPN until discharge               2. Maintain optimal BG levels with compliance to diet.   Nutrition Goal Status: new

## 2019-01-29 NOTE — PROGRESS NOTES
01/29/19 1040   [facility] Group Therapy   Group Name Therapeutic Recreation   Specific Interventions Coping Skills Training   Participation Level Active   Participation Quality Cooperative   Insight/Motivation Applies New Skills;Good   Affect/Mood Display Appropriate   Cognition Alert   Psychomotor WNL   Patient shows interest and ability to comprehend directions, made independent decisions, desired to win before being pulled out of group by another staff. Patient returned to group and resumed with the activity.
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
## 2019-01-29 NOTE — NURSING
"Staff was in nurses station between 15 minute rounds and heard a loud noise on the unit and immediately the charge nurse and this writer went to check on all pts.  This pt was found as he was getting into his bed.  Upon questioning, pt stated that he had gone to the bathroom, became dizzy and fell.  Pt stated that he had a "charley horse" on both sides of his trunk.  VS taken about 0220 as follows:  B/P 106/57,  HR 56, resp 16.  Accucheck done at 0220 with results of 154.  No bruising, bleeding, scrapes, redness or bumps noted.  No other complaints besides bilateral trunk pain.  Pt was given tylenol 650 mg po at 0238 along with a manual call bell.  Call bell placed on wood frame of pt's bed within pt's reach and pt was instructed to ring the call bell before attempting to get out of bed and allow staff assist him.  Pt verbalized understanding.  Pt was given another pair of nonskid socks and they were applied to pt's feet and pt  instructed to keep the socks on at all times.  Pt verbalized understanding.  A fall risk band was applied to pt's wrist.    "

## 2019-01-30 LAB
POCT GLUCOSE: 138 MG/DL (ref 70–110)
POCT GLUCOSE: 159 MG/DL (ref 70–110)

## 2019-01-30 PROCEDURE — 90833 PR PSYCHOTHERAPY W/PATIENT W/E&M, 30 MIN (ADD ON): ICD-10-PCS | Mod: ,,, | Performed by: PSYCHIATRY & NEUROLOGY

## 2019-01-30 PROCEDURE — 11400000 HC PSYCH PRIVATE ROOM

## 2019-01-30 PROCEDURE — 90833 PSYTX W PT W E/M 30 MIN: CPT | Mod: ,,, | Performed by: PSYCHIATRY & NEUROLOGY

## 2019-01-30 PROCEDURE — 25000003 PHARM REV CODE 250: Performed by: PSYCHIATRY & NEUROLOGY

## 2019-01-30 PROCEDURE — 99233 PR SUBSEQUENT HOSPITAL CARE,LEVL III: ICD-10-PCS | Mod: ,,, | Performed by: PSYCHIATRY & NEUROLOGY

## 2019-01-30 PROCEDURE — 99233 SBSQ HOSP IP/OBS HIGH 50: CPT | Mod: ,,, | Performed by: PSYCHIATRY & NEUROLOGY

## 2019-01-30 RX ORDER — PRAZOSIN HYDROCHLORIDE 1 MG/1
2 CAPSULE ORAL 2 TIMES DAILY
Status: DISCONTINUED | OUTPATIENT
Start: 2019-01-30 | End: 2019-01-31

## 2019-01-30 RX ORDER — GABAPENTIN 400 MG/1
400 CAPSULE ORAL 2 TIMES DAILY
Status: DISCONTINUED | OUTPATIENT
Start: 2019-01-30 | End: 2019-02-03 | Stop reason: HOSPADM

## 2019-01-30 RX ADMIN — ASPIRIN 81 MG: 81 TABLET, COATED ORAL at 08:01

## 2019-01-30 RX ADMIN — ACETAMINOPHEN 650 MG: 325 TABLET ORAL at 08:01

## 2019-01-30 RX ADMIN — QUETIAPINE FUMARATE 150 MG: 100 TABLET ORAL at 08:01

## 2019-01-30 RX ADMIN — THERA TABS 1 TABLET: TAB at 08:01

## 2019-01-30 RX ADMIN — HYDROCHLOROTHIAZIDE 25 MG: 25 TABLET ORAL at 08:01

## 2019-01-30 RX ADMIN — QUINAPRIL 20 MG: 10 TABLET ORAL at 08:01

## 2019-01-30 RX ADMIN — GABAPENTIN 400 MG: 400 CAPSULE ORAL at 08:01

## 2019-01-30 RX ADMIN — PRAZOSIN HYDROCHLORIDE 2 MG: 1 CAPSULE ORAL at 08:01

## 2019-01-30 RX ADMIN — FOLIC ACID 1 MG: 1 TABLET ORAL at 08:01

## 2019-01-30 RX ADMIN — OSELTAMIVIR PHOSPHATE 75 MG: 75 CAPSULE ORAL at 08:01

## 2019-01-30 RX ADMIN — GEMFIBROZIL 600 MG: 600 TABLET ORAL at 04:01

## 2019-01-30 RX ADMIN — METFORMIN HYDROCHLORIDE 500 MG: 500 TABLET ORAL at 07:01

## 2019-01-30 RX ADMIN — ATORVASTATIN CALCIUM 40 MG: 20 TABLET, FILM COATED ORAL at 08:01

## 2019-01-30 RX ADMIN — SERTRALINE HYDROCHLORIDE 50 MG: 50 TABLET ORAL at 08:01

## 2019-01-30 RX ADMIN — METOPROLOL SUCCINATE 100 MG: 25 TABLET, EXTENDED RELEASE ORAL at 08:01

## 2019-01-30 RX ADMIN — GEMFIBROZIL 600 MG: 600 TABLET ORAL at 06:01

## 2019-01-30 NOTE — PLAN OF CARE
Problem: Adult Behavioral Health Plan of Care  Goal: Plan of Care Review  Outcome: Ongoing (interventions implemented as appropriate)  Pt out on unit all shift.Pt participating in treatment and activities.Appetite good and hygiene/grooming adequate.Mood remains depressed.Pt still hopeful that his marriage can be saved.Medication compliant.
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
## 2019-01-30 NOTE — PLAN OF CARE
"Problem: Adult Behavioral Health Plan of Care  Goal: Optimized Coping Skills in Response to Life Stressors    Intervention: Promote Effective Coping Strategies  Group Note:    Behavior:  The patient attended group and participated. He presented with improving depressed mood and congruent affect.    Intervention:  The counselor approached this group therapy session with materials and handouts prescribed in the Group Treatment for Substance Abuse, second edition, A [facility]es[facility]-[id number] Therapy Manual. The materials used were from Precontemplation/Contemplation/Preparation Session One (pp. 53-61). For those in which substance abuse is not something in which they are hoping to work on, the materials were adapted to discuss other behaviors in which they hope to make changes.     Response:  The patient said, "I want to work on being a less dominant person. I need to focus on what other people want." He was able to articulate previous lifestyle changes he has made and discuss ways he can use the skills he has previously learned to help him in making present goals.    Plan:  Continue to encourage the patient to participate in milieu.                          "

## 2019-01-30 NOTE — PROGRESS NOTES
"PSYCHIATRY DAILY INPATIENT PROGRESS NOTE  SUBSEQUENT HOSPITAL VISIT    ENCOUNTER DATE: 1/30/2019  SITE: [facility]    DATE OF ADMISSION: 1/27/2019 12:46 PM  LENGTH OF STAY: 3 days      HISTORY    CHIEF COMPLAINT   Terrance Rodríguez is a 48 y.o. male, seen during daily chiu rounds on the inpatient unit.  Terrance Rodríguez presents with the chief complaint of depression and SI, "I was going to shoot myself."    HPI   (Elements: Location, Quality, Severity, Duration, Timing, Content, Modifying Factors, Associated Signs & Symptoms)    The patient was seen and examined. The chart was reviewed. Reviewed notes by the RNs, RD, LPC, and LPN from the last 24 hours.      Staff reports no behavioral or management issues.     The patient has been compliant with treatment. The patient denied any side effects.    He had no falls over the last 24 hours. He denied any current pain from it (chronic pain persists though).      Overall, he reports mild changes since admission, with decreasing SI/hopelessness  (less frequent and less intense).    Continued but less Symptoms of Depression: less diminished mood or loss of interest/anhedonia; less irritability, less diminished energy, less change in sleep, no change in appetite, less diminished concentration or cognition or indecisiveness, no PMA/R, less excessive guilt or hopelessness or worthlessness, less suicidal ideations     Continued but less Changes in Sleep: less trouble with initiation/maintenance, no early morning awakening with inability to return to sleep; slept 6 hours last night     Continued but less Suicidal/(no)Homicidal ideations: less active/passive ideations, less organized plans (shoot self), no future intentions     Denied past or current Symptoms of psychosis: no hallucinations, delusions, disorganized thinking, disorganized behavior or abnormal motor behavior, or negative symptoms      Denied past or current Symptoms of juani or hypomania: no no , expansive, " or irritable mood with increased energy or activity; with no inflated self-esteem or grandiosity, decreased need for sleep, increased rate of speech, FOI or racing thoughts, distractibility, increased goal directed activity or PMA, or risky/disinhibited behavior     Continued but less Symptoms of EAMON: less excessive anxiety/worry/fear, with less restlessness, less fatigue, less poor concentration, less irritability, less muscle tension, less sleep disturbance     No current Symptoms of Panic Disorder: no panic attacks since admission; without agoraphobia     Continued but less Symptoms of PTSD: +h/o trauma; +re-experiencing/intrusive symptoms, +avoidant behavior, +negative alterations in cognition or mood, +hyperarousal symptoms; without dissociative symptoms       PSYCHOTHERAPY ADD-ON +58611   30 (16-37*) minutes    Time: 30 minutes  Participants: Met with patient    Therapeutic Intervention Type: behavior modifying psychotherapy, supportive psychotherapy  Why chosen therapy is appropriate versus another modality: relevant to diagnosis, patient responds to this modality, evidence based practice    Target symptoms: depression, anxiety   Primary focus: depression, psychosocial stressors, trauma  Psychotherapeutic techniques: supportive, behavioral and problem solving techniques; psycho-education; insight-oriented techniques    Outcome monitoring methods: self-report, observation    Patient's response to intervention:  The patient's response to intervention is accepting.    Progress toward goals:  The patient's progress toward goals is fair .          ROS  General ROS: negative  Ophthalmic ROS: negative  ENT ROS: negative  Allergy and Immunology ROS: negative  Hematological and Lymphatic ROS: negative  Endocrine ROS: negative  Respiratory ROS: no cough, shortness of breath, or wheezing  Cardiovascular ROS: no chest pain or dyspnea on exertion  Gastrointestinal ROS: no abdominal pain, change in bowel habits, or black  or bloody stools  Genito-Urinary ROS: no dysuria, trouble voiding, or hematuria  Musculoskeletal ROS: negative  Neurological ROS: no TIA or stroke symptoms  Dermatological ROS: negative    PAST MEDICAL HISTORY   Past Medical History:   Diagnosis Date    Anxiety     Depression     Diabetes mellitus     Diabetes mellitus, type 2     Headache     High cholesterol     Hx of psychiatric care     Hypertension     Insomnia     MI (myocardial infarction)     Panic disorder     Psychiatric problem     PTSD (post-traumatic stress disorder)     Sleep difficulties     Status post fracture of femur     Substance abuse     Suicide attempt     Therapy            PSYCHOTROPIC MEDICATIONS   Scheduled Meds:   aspirin  81 mg Oral Daily    atorvastatin  40 mg Oral Daily    folic acid  1 mg Oral Daily    gabapentin  300 mg Oral BID    gemfibrozil  600 mg Oral BID AC    hydroCHLOROthiazide  25 mg Oral Daily    metFORMIN  500 mg Oral Daily with breakfast    metoprolol succinate  100 mg Oral Daily    multivitamin  1 tablet Oral Daily    oseltamivir  75 mg Oral Daily    prazosin  1 mg Oral BID    QUEtiapine  150 mg Oral QHS    quinapril  20 mg Oral QHS    sertraline  50 mg Oral Daily     Continuous Infusions:  PRN Meds:.acetaminophen, aluminum-magnesium hydroxide-simethicone, dextrose 50%, dextrose 50%, docusate sodium, glucagon (human recombinant), glucose, glucose, hydrOXYzine pamoate, insulin aspart U-100, loperamide, nitroGLYCERIN, OLANZapine **AND** OLANZapine        EXAMINATION    VITALS   Vitals:    01/29/19 2028   BP: 133/82   Pulse: 74   Resp: 18   Temp: 97.1 °F (36.2 °C)     Body mass index is 39.39 kg/m².      CONSTITUTIONAL  General Appearance: WM, in casual garb, obese; NAD     MUSCULOSKELETAL  Muscle Strength and Tone:  normal  Abnormal Involuntary Movements:  none  Gait and Station:  non-ataxic but mild limp secondary to leg pain     PSYCHIATRIC   Level of Consciousness: awake,  "alert  Orientation: p/p/t/s  Grooming:  adequate to circumstances  Psychomotor Behavior: no PMA/R  Speech: nl r/t/v/s  Language:  English fluent  Mood: "a little better"  Affect: dysphoric, less anxious, decreased range, not tearful, intense  Thought Process:  linear and organized  Associations:  intact; no AVERY  Thought Content:  denied AVH/delusions; denied HI, less SI  Memory:  intact to recent and remote events  Attention:  intact to conversation; not distractible   Fund of Knowledge:  age and education appropriate  Estimate if Intelligence:  average based on work/education history, vocabulary and mental status exam  Insight:  good- recognizes illness and need for tx/help  Judgment:  good- no bx issues, compliant and cooperative       DIAGNOSTIC TESTING   Laboratory Results  Recent Results (from the past 24 hour(s))   POCT glucose    Collection Time: 01/29/19  4:22 PM   Result Value Ref Range    POCT Glucose 130 (H) 70 - 110 mg/dL   POCT glucose    Collection Time: 01/30/19  7:27 AM   Result Value Ref Range    POCT Glucose 159 (H) 70 - 110 mg/dL         ASSESSMENT      IMPRESSION   MDD, recurrent, severe without psychotic features  EAMON  PTSD  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  Pain Disorder with physiological and psychological factors     Psychosocial stressors     NIDDM  Essential HTN  HLD  CAD        MEDICAL DECISION MAKING          PROBLEM LIST AND MANAGEMENT PLANS;PRESCRIPTION DRUG MANAGEMENT  Compliance: yes  Side Effects: no  Regimen Adjustments:     Depression/Anxiety: Stop home meds of cymbalta (pt reports no efficacy); Continue Adjunctive Seroquel to 150 mg po q HS (off-label); continue zoloft to 50 mg po q day     PTSD: zoloft; seroquel off-label; Increase prazosin to 2 mg po BID     Insomnia: seroquel off-label     Pain: Increase gabapentin to 400 mg po BID     Psychosocial stressors: pt counseled; SW assisting with resources; refer for couple's therapy; refer for anger management; refer to [place] " [employer] for employment help; refer for disability assitance     NIDDM: continue home med of Metformin 500 mg po q day; FM consulted     Essential HTN: Continue home med of HCTZ 25 mg po q day, Metoprolol  mg po q day, quinapril 20 mg po q night; ; FM consulted     HLD: continue home med of Lipitor 40 mg po q day , gemfibrozil 600 mg po BID; FM consulted     CAD: continue Aspirin 81 mg po q day; nitro prn; FM consulted        DIAGNOSTIC TESTING  Labs reviewed; follow up pending labs     Disposition:  -SW to assist with aftercare planning and collateral  -Once stable discharge home with outpatient follow up care and IOP  -Continue inpatient treatment under a PEC and/or C[facility] for danger to self as evident by depression with SI requiring inpatient psychiatric stabilization.        NEED FOR CONTINUED HOSPITALIZATION  Psychiatric illness continues to pose a potential threat to life or bodily function, of self or others, thereby requiring the need for continued inpatient psychiatric hospitalization: Yes    Protective inpatient pyschiatric hospitalization required while a safe disposition plan is enacted: Yes    Patient stabilized and ready for discharge from inpatient psychiatric unit: No      STAFF:   Tereso Quinones MD  Psychiatry

## 2019-01-30 NOTE — PROGRESS NOTES
01/30/19 1040   [facility] Group Therapy   Group Name Therapeutic Recreation   Specific Interventions Coping Skills Training   Participation Level Active   Participation Quality Cooperative;Social   Insight/Motivation Good   Affect/Mood Display Appropriate   Cognition Alert   Psychomotor WNL   Patient participates easily in activity, gave fist bump and respond with excitement to winning and competition. Patient was perky and praised peer success with the activity.

## 2019-01-30 NOTE — PLAN OF CARE
Problem: Fall Injury Risk  Goal: Absence of Fall and Fall-Related Injury    Intervention: Identify and Manage Contributors to Fall Injury Risk  Plan of care reviewed.  Denies intent to harm self or others at this time.  Accepts snacks and medications.  Gait steady, no falls.  Pleasantly interacting with staff and peers. Mood is greatly improved.  Spoke with wife on phone.  States that his wife asked for a divorce but he's not giving up that easily.  Remained in a good mood after talking with wife on the phone despite that she did not say she wants him back.  Promoted an individualized safety plan, reality-based interactions, effective coping strategies, and impulse control.  Will continue to monitor for safety.

## 2019-01-30 NOTE — PLAN OF CARE
Problem: Adult Behavioral Health Plan of Care  Goal: Plan of Care Review  Outcome: Ongoing (interventions implemented as appropriate)  Lying quiet in bed, eyes closed, respiration even and unlabored, appearing asleep.  Slept most all shift with one awakening at about 0315 to go to bathroom.  Noted asleep by next 15 min round..  Safety and precautions maintained with rounds every 15 minutes, bed is fixed in a low position and pathways kept clear.  No fall occurred.

## 2019-01-31 LAB
POCT GLUCOSE: 124 MG/DL (ref 70–110)
POCT GLUCOSE: 146 MG/DL (ref 70–110)

## 2019-01-31 PROCEDURE — 11400000 HC PSYCH PRIVATE ROOM

## 2019-01-31 PROCEDURE — 90833 PSYTX W PT W E/M 30 MIN: CPT | Mod: ,,, | Performed by: PSYCHIATRY & NEUROLOGY

## 2019-01-31 PROCEDURE — 25000003 PHARM REV CODE 250: Performed by: PSYCHIATRY & NEUROLOGY

## 2019-01-31 PROCEDURE — 99233 PR SUBSEQUENT HOSPITAL CARE,LEVL III: ICD-10-PCS | Mod: ,,, | Performed by: PSYCHIATRY & NEUROLOGY

## 2019-01-31 PROCEDURE — 90833 PR PSYCHOTHERAPY W/PATIENT W/E&M, 30 MIN (ADD ON): ICD-10-PCS | Mod: ,,, | Performed by: PSYCHIATRY & NEUROLOGY

## 2019-01-31 PROCEDURE — 99233 SBSQ HOSP IP/OBS HIGH 50: CPT | Mod: ,,, | Performed by: PSYCHIATRY & NEUROLOGY

## 2019-01-31 RX ORDER — PRAZOSIN HYDROCHLORIDE 1 MG/1
3 CAPSULE ORAL 2 TIMES DAILY
Status: DISCONTINUED | OUTPATIENT
Start: 2019-01-31 | End: 2019-02-03 | Stop reason: HOSPADM

## 2019-01-31 RX ADMIN — THERA TABS 1 TABLET: TAB at 08:01

## 2019-01-31 RX ADMIN — ATORVASTATIN CALCIUM 40 MG: 20 TABLET, FILM COATED ORAL at 08:01

## 2019-01-31 RX ADMIN — METFORMIN HYDROCHLORIDE 500 MG: 500 TABLET ORAL at 08:01

## 2019-01-31 RX ADMIN — QUINAPRIL 20 MG: 10 TABLET ORAL at 08:01

## 2019-01-31 RX ADMIN — GEMFIBROZIL 600 MG: 600 TABLET ORAL at 03:01

## 2019-01-31 RX ADMIN — METOPROLOL SUCCINATE 100 MG: 25 TABLET, EXTENDED RELEASE ORAL at 08:01

## 2019-01-31 RX ADMIN — PRAZOSIN HYDROCHLORIDE 2 MG: 1 CAPSULE ORAL at 08:01

## 2019-01-31 RX ADMIN — GEMFIBROZIL 600 MG: 600 TABLET ORAL at 06:01

## 2019-01-31 RX ADMIN — FOLIC ACID 1 MG: 1 TABLET ORAL at 08:01

## 2019-01-31 RX ADMIN — QUETIAPINE FUMARATE 150 MG: 100 TABLET ORAL at 08:01

## 2019-01-31 RX ADMIN — OSELTAMIVIR PHOSPHATE 75 MG: 75 CAPSULE ORAL at 08:01

## 2019-01-31 RX ADMIN — ACETAMINOPHEN 650 MG: 325 TABLET ORAL at 08:01

## 2019-01-31 RX ADMIN — PRAZOSIN HYDROCHLORIDE 3 MG: 1 CAPSULE ORAL at 08:01

## 2019-01-31 RX ADMIN — ASPIRIN 81 MG: 81 TABLET, COATED ORAL at 08:01

## 2019-01-31 RX ADMIN — GABAPENTIN 400 MG: 400 CAPSULE ORAL at 08:01

## 2019-01-31 RX ADMIN — SERTRALINE HYDROCHLORIDE 50 MG: 50 TABLET ORAL at 08:01

## 2019-01-31 RX ADMIN — HYDROCHLOROTHIAZIDE 25 MG: 25 TABLET ORAL at 08:01

## 2019-01-31 NOTE — PROGRESS NOTES
01/31/19 1040   [facility] Group Therapy   Group Name Leisure Skills Training   Specific Interventions Cognitive Stimulation Training   Participation Level Active;Sharing   Participation Quality Cooperative;Social   Insight/Motivation Applies New Skills;Good   Affect/Mood Display Appropriate   Cognition Alert   Psychomotor WNL   Patient participates easily, learned new skilled activity and resource information. Patient brainstormed with peers to answer questions, shows friendly competition and good teamwork. Patient coloring artwork during the activity.

## 2019-01-31 NOTE — PROGRESS NOTES
"PSYCHIATRY DAILY INPATIENT PROGRESS NOTE  SUBSEQUENT HOSPITAL VISIT    ENCOUNTER DATE: 1/31/2019  SITE: [facility]    DATE OF ADMISSION: 1/27/2019 12:46 PM  LENGTH OF STAY: 4 days      HISTORY    CHIEF COMPLAINT   Terrance Rodríguez is a 48 y.o. male, seen during daily chiu rounds on the inpatient unit.  Terrance Rodríguez presents with the chief complaint of depression and SI, "I was going to shoot myself."    HPI   (Elements: Location, Quality, Severity, Duration, Timing, Content, Modifying Factors, Associated Signs & Symptoms)    The patient was seen and examined. The chart was reviewed. Reviewed notes by the RNs, LPC, CTRS from the last 24 hours.      Staff reports no behavioral or management issues.     The patient has been compliant with treatment. The patient denied any side effects.    He had no falls over the last 48 hours. He denied any current pain from it (chronic pain persists though- unchanged).      Overall, he reports mild changes/improvements since admission, with decreasing SI/hopelessness  (less frequent and less intense). He is now thinking of ways to solve his problems and improve his circumstances      Continued but lessening Symptoms of Depression: less diminished mood or loss of interest/anhedonia; less irritability, less diminished energy, less change in sleep, no change in appetite, less diminished concentration or cognition or indecisiveness, no PMA/R, less excessive guilt or hopelessness or worthlessness, less suicidal ideations     Improvng Changes in Sleep: less trouble with initiation/maintenance, no early morning awakening with inability to return to sleep; slept 8.5 hours last night     Continued but lessening Suicidal/(no)Homicidal ideations: less active/passive ideations, no organized plans (no longer wantsshoot self), no future intentions; he is more future oriented and hopeful.      Denied past or current Symptoms of psychosis: no hallucinations, delusions, disorganized " thinking, disorganized behavior or abnormal motor behavior, or negative symptoms      Denied past or current Symptoms of juani or hypomania: no no , expansive, or irritable mood with increased energy or activity; with no inflated self-esteem or grandiosity, decreased need for sleep, increased rate of speech, FOI or racing thoughts, distractibility, increased goal directed activity or PMA, or risky/disinhibited behavior     Continued but lessening Symptoms of EAMON: less excessive anxiety/worry/fear, with less restlessness, less fatigue, less poor concentration, less irritability, less muscle tension, less sleep disturbance     No current Symptoms of Panic Disorder: no panic attacks since admission; without agoraphobia     Continued but lessening Symptoms of PTSD: +h/o trauma; less re-experiencing/intrusive symptoms, less avoidant behavior, less negative alterations in cognition or mood, less hyperarousal symptoms; without dissociative symptoms       PSYCHOTHERAPY ADD-ON +10433   30 (16-37*) minutes    Time: 16 minutes  Participants: Met with patient    Therapeutic Intervention Type: behavior modifying psychotherapy, supportive psychotherapy  Why chosen therapy is appropriate versus another modality: relevant to diagnosis, patient responds to this modality, evidence based practice    Target symptoms: depression, anxiety   Primary focus: depression, psychosocial stressors, trauma  Psychotherapeutic techniques: supportive, behavioral and problem solving techniques; psycho-education; insight-oriented techniques; identifying strengths and resources    Outcome monitoring methods: self-report, observation    Patient's response to intervention:  The patient's response to intervention is accepting.    Progress toward goals:  The patient's progress toward goals is fair .          ROS  General ROS: negative  Ophthalmic ROS: negative  ENT ROS: negative  Allergy and Immunology ROS: negative  Hematological and Lymphatic ROS:  negative  Endocrine ROS: negative  Respiratory ROS: no cough, shortness of breath, or wheezing  Cardiovascular ROS: no chest pain or dyspnea on exertion  Gastrointestinal ROS: no abdominal pain, change in bowel habits, or black or bloody stools  Genito-Urinary ROS: no dysuria, trouble voiding, or hematuria  Musculoskeletal ROS: negative  Neurological ROS: no TIA or stroke symptoms  Dermatological ROS: negative    PAST MEDICAL HISTORY   Past Medical History:   Diagnosis Date    Anxiety     Depression     Diabetes mellitus     Diabetes mellitus, type 2     Headache     High cholesterol     Hx of psychiatric care     Hypertension     Insomnia     MI (myocardial infarction)     Panic disorder     Psychiatric problem     PTSD (post-traumatic stress disorder)     Sleep difficulties     Status post fracture of femur     Substance abuse     Suicide attempt     Therapy            PSYCHOTROPIC MEDICATIONS   Scheduled Meds:   aspirin  81 mg Oral Daily    atorvastatin  40 mg Oral Daily    folic acid  1 mg Oral Daily    gabapentin  400 mg Oral BID    gemfibrozil  600 mg Oral BID AC    hydroCHLOROthiazide  25 mg Oral Daily    metFORMIN  500 mg Oral Daily with breakfast    metoprolol succinate  100 mg Oral Daily    multivitamin  1 tablet Oral Daily    oseltamivir  75 mg Oral Daily    prazosin  2 mg Oral BID    QUEtiapine  150 mg Oral QHS    quinapril  20 mg Oral QHS    sertraline  50 mg Oral Daily     Continuous Infusions:  PRN Meds:.acetaminophen, aluminum-magnesium hydroxide-simethicone, dextrose 50%, dextrose 50%, docusate sodium, glucagon (human recombinant), glucose, glucose, hydrOXYzine pamoate, insulin aspart U-100, loperamide, nitroGLYCERIN, OLANZapine **AND** OLANZapine        EXAMINATION    VITALS   Vitals:    01/31/19 0735   BP: 139/80   Pulse: 65   Resp: 18   Temp: 97.7 °F (36.5 °C)     Body mass index is 37.56 kg/m².      CONSTITUTIONAL  General Appearance: WM, in casual garb, obese;  "NAD     MUSCULOSKELETAL  Muscle Strength and Tone:  normal  Abnormal Involuntary Movements:  none  Gait and Station:  non-ataxic but mild limp secondary to leg pain     PSYCHIATRIC   Level of Consciousness: awake, alert  Orientation: p/p/t/s  Grooming:  adequate to circumstances  Psychomotor Behavior: no PMA/R  Speech: nl r/t/v/s  Language:  English fluent  Mood: "a little better than yesterday"  Affect: dysphoric, less anxious, decreased range, not tearful, intense  Thought Process:  linear and organized  Associations:  intact; no AVERY  Thought Content:  denied AVH/delusions; denied HI, less SI  Memory:  intact to recent and remote events  Attention:  intact to conversation; not distractible   Fund of Knowledge:  age and education appropriate  Estimate if Intelligence:  average based on work/education history, vocabulary and mental status exam  Insight:  good- recognizes illness and need for tx/help  Judgment:  good- no bx issues, compliant and cooperative       DIAGNOSTIC TESTING   Laboratory Results  Recent Results (from the past 24 hour(s))   POCT glucose    Collection Time: 01/30/19  4:41 PM   Result Value Ref Range    POCT Glucose 138 (H) 70 - 110 mg/dL   POCT glucose    Collection Time: 01/31/19  7:23 AM   Result Value Ref Range    POCT Glucose 146 (H) 70 - 110 mg/dL         ASSESSMENT      IMPRESSION   MDD, recurrent, severe without psychotic features  EAMON  PTSD  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  Pain Disorder with physiological and psychological factors     Psychosocial stressors     NIDDM  Essential HTN  HLD  CAD        MEDICAL DECISION MAKING          PROBLEM LIST AND MANAGEMENT PLANS;PRESCRIPTION DRUG MANAGEMENT  Compliance: yes  Side Effects: no  Regimen Adjustments:     Depression/Anxiety: Stop home meds of cymbalta (pt reports no efficacy); Continue Adjunctive Seroquel to 150 mg po q HS (off-label); increase zoloft to 75 mg po q day with a goal of 100 mg prior to discharge     PTSD: zoloft; " seroquel off-label; Increase prazosin to 3 mg po BID     Insomnia: seroquel off-label     Pain: Increased gabapentin to 400 mg po BID     Psychosocial stressors: pt counseled; SW assisting with resources; refer for couple's therapy; refer for anger management; refer to [place] [employer] for employment help; refer for disability assitance     NIDDM: continue home med of Metformin 500 mg po q day; FM consulted     Essential HTN: Continue home med of HCTZ 25 mg po q day, Metoprolol  mg po q day, quinapril 20 mg po q night; ; FM consulted     HLD: continue home med of Lipitor 40 mg po q day , gemfibrozil 600 mg po BID; FM consulted     CAD: continue Aspirin 81 mg po q day; nitro prn; FM consulted        DIAGNOSTIC TESTING  Labs reviewed; follow up pending labs     Disposition:  -SW to assist with aftercare planning and collateral  -Once stable discharge home with outpatient follow up care and IOP  -Continue inpatient treatment under a PEC and/or CEC for danger to self as evident by depression with SI requiring inpatient psychiatric stabilization. If patient continues to improve, will plan to discharge home on Sunday and start the IOP on Monday.       NEED FOR CONTINUED HOSPITALIZATION  Psychiatric illness continues to pose a potential threat to life or bodily function, of self or others, thereby requiring the need for continued inpatient psychiatric hospitalization: Yes    Protective inpatient pyschiatric hospitalization required while a safe disposition plan is enacted: Yes    Patient stabilized and ready for discharge from inpatient psychiatric unit: No      STAFF:   Tereso Quinones MD  Psychiatry

## 2019-01-31 NOTE — PLAN OF CARE
Problem: Adult Behavioral Health Plan of Care  Goal: Optimized Coping Skills in Response to Life Stressors    Intervention: Promote Effective Coping Strategies  Group Note:    Behavior:   The patient attended group and participated. He presented with significant mood improvement and congruent affect.    Intervention:  The LPC implemented a CBT based approach to group therapy focused on developing SMART goals. Patients discussed barriers to achieve their goals as well as ways to overcome them.    Response:  He set a goal of returning to Yazidism. He walked through the SMART steps. He also discussed barriers such as anxiety, shame, and fear of judgement, but said he would like to work on overcoming these goals. Additionally, he discussed his fear of vulnerability.    Plan:  Continue to encourage the patient to participate in milieu.

## 2019-01-31 NOTE — PLAN OF CARE
Problem: Adult Behavioral Health Plan of Care  Goal: Plan of Care Review  Outcome: Ongoing (interventions implemented as appropriate)  Pt out on unit all shift.Pt reports trouble falling asleep last night.Appetite good.Hygiene and grooming adequate.Pt participating in unit activities.Mood improving and more hopeful about the future.Pt talking with wife on phone and pt feels they will be able to work on relationship.Medication compliant.

## 2019-02-01 LAB
POCT GLUCOSE: 145 MG/DL (ref 70–110)
POCT GLUCOSE: 157 MG/DL (ref 70–110)

## 2019-02-01 PROCEDURE — 90833 PSYTX W PT W E/M 30 MIN: CPT | Mod: ,,, | Performed by: PSYCHIATRY & NEUROLOGY

## 2019-02-01 PROCEDURE — 99233 PR SUBSEQUENT HOSPITAL CARE,LEVL III: ICD-10-PCS | Mod: ,,, | Performed by: PSYCHIATRY & NEUROLOGY

## 2019-02-01 PROCEDURE — 25000003 PHARM REV CODE 250: Performed by: PSYCHIATRY & NEUROLOGY

## 2019-02-01 PROCEDURE — 90833 PR PSYCHOTHERAPY W/PATIENT W/E&M, 30 MIN (ADD ON): ICD-10-PCS | Mod: ,,, | Performed by: PSYCHIATRY & NEUROLOGY

## 2019-02-01 PROCEDURE — 99233 SBSQ HOSP IP/OBS HIGH 50: CPT | Mod: ,,, | Performed by: PSYCHIATRY & NEUROLOGY

## 2019-02-01 PROCEDURE — 11400000 HC PSYCH PRIVATE ROOM

## 2019-02-01 RX ORDER — SERTRALINE HYDROCHLORIDE 100 MG/1
100 TABLET, FILM COATED ORAL DAILY
Status: DISCONTINUED | OUTPATIENT
Start: 2019-02-02 | End: 2019-02-03 | Stop reason: HOSPADM

## 2019-02-01 RX ADMIN — OSELTAMIVIR PHOSPHATE 75 MG: 75 CAPSULE ORAL at 08:02

## 2019-02-01 RX ADMIN — ASPIRIN 81 MG: 81 TABLET, COATED ORAL at 08:02

## 2019-02-01 RX ADMIN — GEMFIBROZIL 600 MG: 600 TABLET ORAL at 06:02

## 2019-02-01 RX ADMIN — GABAPENTIN 400 MG: 400 CAPSULE ORAL at 08:02

## 2019-02-01 RX ADMIN — HYDROCHLOROTHIAZIDE 25 MG: 25 TABLET ORAL at 08:02

## 2019-02-01 RX ADMIN — ACETAMINOPHEN 650 MG: 325 TABLET ORAL at 08:02

## 2019-02-01 RX ADMIN — ATORVASTATIN CALCIUM 40 MG: 20 TABLET, FILM COATED ORAL at 08:02

## 2019-02-01 RX ADMIN — METFORMIN HYDROCHLORIDE 500 MG: 500 TABLET ORAL at 07:02

## 2019-02-01 RX ADMIN — PRAZOSIN HYDROCHLORIDE 3 MG: 1 CAPSULE ORAL at 08:02

## 2019-02-01 RX ADMIN — GEMFIBROZIL 600 MG: 600 TABLET ORAL at 04:02

## 2019-02-01 RX ADMIN — SERTRALINE HYDROCHLORIDE 75 MG: 50 TABLET ORAL at 08:02

## 2019-02-01 RX ADMIN — FOLIC ACID 1 MG: 1 TABLET ORAL at 08:02

## 2019-02-01 RX ADMIN — METOPROLOL SUCCINATE 100 MG: 25 TABLET, EXTENDED RELEASE ORAL at 08:02

## 2019-02-01 RX ADMIN — QUINAPRIL 20 MG: 10 TABLET ORAL at 08:02

## 2019-02-01 RX ADMIN — QUETIAPINE FUMARATE 150 MG: 100 TABLET ORAL at 08:02

## 2019-02-01 RX ADMIN — THERA TABS 1 TABLET: TAB at 08:02

## 2019-02-01 NOTE — PROGRESS NOTES
"   02/01/19 1040   [facility] Group Therapy   Group Name Leisure Skills Training   Specific Interventions Cognitive Stimulation Training   Participation Level None   Patient did not participate in the group activity due to "my back hurts. I'm tired." Patient took a word search puzzle book to his room with a crayon to work on. When staff checked on the patient he was working on word search puzzles.  "

## 2019-02-01 NOTE — PLAN OF CARE
Problem: Adult Behavioral Health Plan of Care  Goal: Optimized Coping Skills in Response to Life Stressors    Intervention: Promote Effective Coping Strategies  Group Note:    Behavior:  The patient attended group, but he did not participate because of a headache. He was slightly irritable today.    Intervention:  The counselor implemented a motivational interviewing based group therapy session with materials and handouts prescribed in the Group Treatment for Substance Abuse, second edition, A [id number] Therapy Manual. The materials used were from Action/Maintenance Session 11 discussing positive thinking as a tool to promote change (pp. 242-251).    Response:  He said he was not feeling well and did not wish to participate.    Plan:  Continue to encourage the patient to participate in milieu.

## 2019-02-01 NOTE — PLAN OF CARE
Problem: Adult Behavioral Health Plan of Care  Goal: Plan of Care Review  Outcome: Ongoing (interventions implemented as appropriate)  Pt is sleeping at this time and has slept 7.5 hours unniterrupted.  NAD.  Resp even & unlabored.  Pathways clear and bed in low position.  Q 15 minute safety checks ongoing.  All precautions maintained

## 2019-02-01 NOTE — PLAN OF CARE
Problem: Adult Behavioral Health Plan of Care  Goal: Plan of Care Review  Outcome: Ongoing (interventions implemented as appropriate)  Pt out on unit reading in day room.Pt reports continued trouble falling asleep at night.Appetite adequate.Hygiene and grooming fair.Pt steady on his feet.Mood improving and pt hopeful about the future.Medication compliant.

## 2019-02-01 NOTE — PROGRESS NOTES
"PSYCHIATRY DAILY INPATIENT PROGRESS NOTE  SUBSEQUENT HOSPITAL VISIT    ENCOUNTER DATE: 2/1/2019  SITE: [facility]    DATE OF ADMISSION: 1/27/2019 12:46 PM  LENGTH OF STAY: 5 days      HISTORY    CHIEF COMPLAINT   Terrance Rodríguez is a 48 y.o. male, seen during daily chiu rounds on the inpatient unit.  Terrance Rodríguez presents with the chief complaint of depression and SI, "I was going to shoot myself."    HPI   (Elements: Location, Quality, Severity, Duration, Timing, Content, Modifying Factors, Associated Signs & Symptoms)    The patient was seen and examined. The chart was reviewed. Reviewed notes by the RNs, LPC, CTRS from the last 24 hours.      Staff reports no behavioral or management issues.     The patient has been compliant with treatment. The patient denied any side effects.    Patient no longer having issues with falls.  Discussed prazosin as a potential cause.  Suicidal ideation is improving.  He is future oriented toward iop on Monday.       Improving Symptoms of Depression: less diminished mood or loss of interest/anhedonia; less irritability, less diminished energy, less change in sleep, no change in appetite, less diminished concentration or cognition or indecisiveness, no PMA/R, less excessive guilt or hopelessness or worthlessness, no suicidal ideations     Improvng Changes in Sleep: less trouble with initiation/maintenance, no early morning awakening with inability to return to sleep; slept 8.5 hours last night     Resolving Suicidal/(no)Homicidal ideations: no active/passive ideations, no organized plans (no longer wantsshoot self), no future intentions; he is more future oriented and hopeful.      No psychosis or juani     Continued but lessening Symptoms of EAMON: less excessive anxiety/worry/fear, with less restlessness, less fatigue, less poor concentration, less irritability, less muscle tension, less sleep disturbance     No current Symptoms of Panic Disorder: no panic attacks since " admission; without agoraphobia     Continued but lessening Symptoms of PTSD: +h/o trauma; less re-experiencing/intrusive symptoms, less avoidant behavior, less negative alterations in cognition or mood, less hyperarousal symptoms; without dissociative symptoms       PSYCHOTHERAPY ADD-ON +99263   30 (16-37*) minutes    Time: 16 minutes  Participants: Met with patient    Therapeutic Intervention Type: behavior modifying psychotherapy, supportive psychotherapy  Why chosen therapy is appropriate versus another modality: relevant to diagnosis, patient responds to this modality, evidence based practice    Target symptoms: depression, anxiety   Primary focus: depression, psychosocial stressors, trauma  Psychotherapeutic techniques:Supportive psychoeducation    Outcome monitoring methods: self-report, observation    Patient's response to intervention:  The patient's response to intervention is accepting.    Progress toward goals:  The patient's progress toward goals is fair .          ROS  General ROS: negative  Ophthalmic ROS: negative  ENT ROS: negative  Allergy and Immunology ROS: negative  Hematological and Lymphatic ROS: negative  Endocrine ROS: negative  Respiratory ROS: no cough, shortness of breath, or wheezing  Cardiovascular ROS: no chest pain or dyspnea on exertion  Gastrointestinal ROS: no abdominal pain, change in bowel habits, or black or bloody stools  Genito-Urinary ROS: no dysuria, trouble voiding, or hematuria  Musculoskeletal ROS: negative  Neurological ROS: no TIA or stroke symptoms  Dermatological ROS: negative    PAST MEDICAL HISTORY   Past Medical History:   Diagnosis Date    Anxiety     Depression     Diabetes mellitus     Diabetes mellitus, type 2     Headache     High cholesterol     Hx of psychiatric care     Hypertension     Insomnia     MI (myocardial infarction)     Panic disorder     Psychiatric problem     PTSD (post-traumatic stress disorder)     Sleep difficulties     Status  "post fracture of femur     Substance abuse     Suicide attempt     Therapy            PSYCHOTROPIC MEDICATIONS   Scheduled Meds:   aspirin  81 mg Oral Daily    atorvastatin  40 mg Oral Daily    folic acid  1 mg Oral Daily    gabapentin  400 mg Oral BID    gemfibrozil  600 mg Oral BID AC    hydroCHLOROthiazide  25 mg Oral Daily    metFORMIN  500 mg Oral Daily with breakfast    metoprolol succinate  100 mg Oral Daily    multivitamin  1 tablet Oral Daily    oseltamivir  75 mg Oral Daily    prazosin  3 mg Oral BID    QUEtiapine  150 mg Oral QHS    quinapril  20 mg Oral QHS    [START ON 2/2/2019] sertraline  100 mg Oral Daily     Continuous Infusions:  PRN Meds:.acetaminophen, aluminum-magnesium hydroxide-simethicone, dextrose 50%, dextrose 50%, docusate sodium, glucagon (human recombinant), glucose, glucose, hydrOXYzine pamoate, insulin aspart U-100, loperamide, nitroGLYCERIN, OLANZapine **AND** OLANZapine        EXAMINATION    VITALS   Vitals:    02/01/19 0737   BP: 128/81   Pulse: 74   Resp: 18   Temp: 98.9 °F (37.2 °C)     Body mass index is 37.56 kg/m².      CONSTITUTIONAL  General Appearance: WM, in casual garb, obese; NAD     MUSCULOSKELETAL  Muscle Strength and Tone:  normal  Abnormal Involuntary Movements:  none  Gait and Station:  non-ataxic but mild limp secondary to leg pain     PSYCHIATRIC   Level of Consciousness: awake, alert  Orientation: p/p/t/s  Grooming:  adequate to circumstances  Psychomotor Behavior: no PMA/R  Speech: nl r/t/v/s  Language:  English fluent  Mood: "alright, good"  Affect: somewhat dysphoric  Thought Process:  linear and organized  Associations:  intact; no AVERY  Thought Content:  denied AVH/delusions; denied HI, less SI  Memory:  intact to recent and remote events  Attention:  intact to conversation; not distractible   Fund of Knowledge:  age and education appropriate  Estimate if Intelligence:  average based on work/education history, vocabulary and mental status " exam  Insight:  good- recognizes illness and need for tx/help  Judgment:  good- no bx issues, compliant and cooperative       DIAGNOSTIC TESTING   Laboratory Results  Recent Results (from the past 24 hour(s))   POCT glucose    Collection Time: 01/31/19  4:34 PM   Result Value Ref Range    POCT Glucose 124 (H) 70 - 110 mg/dL   POCT glucose    Collection Time: 02/01/19  7:16 AM   Result Value Ref Range    POCT Glucose 157 (H) 70 - 110 mg/dL         ASSESSMENT      IMPRESSION   MDD, recurrent, severe without psychotic features  EAMON  PTSD  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  Pain Disorder with physiological and psychological factors     Psychosocial stressors     NIDDM  Essential HTN  HLD  CAD        MEDICAL DECISION MAKING          PROBLEM LIST AND MANAGEMENT PLANS;PRESCRIPTION DRUG MANAGEMENT  Compliance: yes  Side Effects: possible sweats from ssri  Regimen Adjustments:     Depression/Anxiety: Stop home meds of cymbalta (pt reports no efficacy); Continue Adjunctive Seroquel to 150 mg po q HS (off-label); increase zoloft to 100 mg po q day      PTSD: zoloft; seroquel off-label; Increased prazosin to 3 mg po BID     Insomnia: seroquel off-label     Pain: Increased gabapentin to 400 mg po BID     Psychosocial stressors: pt counseled; SW assisting with resources; refer for couple's therapy; refer for anger management; refer to [place] [employer] for employment help; refer for disability assitance     NIDDM: continue home med of Metformin 500 mg po q day; FM consulted     Essential HTN: Continue home med of HCTZ 25 mg po q day, Metoprolol  mg po q day, quinapril 20 mg po q night; ; FM consulted     HLD: continue home med of Lipitor 40 mg po q day , gemfibrozil 600 mg po BID; FM consulted     CAD: continue Aspirin 81 mg po q day; nitro prn; FM consulted        DIAGNOSTIC TESTING  Labs reviewed; follow up pending labs     Disposition:  -SW to assist with aftercare planning and collateral  -Once stable discharge home  with outpatient follow up care and IOP  -Continue inpatient treatment under a PEC and/or CEC for danger to self as evident by depression with SI requiring inpatient psychiatric stabilization. If patient continues to improve, will plan to discharge home on Sunday and start the IOP on Monday.       NEED FOR CONTINUED HOSPITALIZATION  Psychiatric illness continues to pose a potential threat to life or bodily function, of self or others, thereby requiring the need for continued inpatient psychiatric hospitalization: Yes    Protective inpatient pyschiatric hospitalization required while a safe disposition plan is enacted: Yes    Patient stabilized and ready for discharge from inpatient psychiatric unit: No      STAFF:   Alex Day MD  Psychiatry

## 2019-02-01 NOTE — PLAN OF CARE
Problem: Adult Behavioral Health Plan of Care  Goal: Plan of Care Review  Outcome: Ongoing (interventions implemented as appropriate)  Pt calm and cooperative, interacting good with staff and peers, med compliant, did not want to take shower, appetite good, safety precautions maintained, will continue to monitor

## 2019-02-02 LAB
POCT GLUCOSE: 139 MG/DL (ref 70–110)
POCT GLUCOSE: 212 MG/DL (ref 70–110)

## 2019-02-02 PROCEDURE — 25000003 PHARM REV CODE 250: Performed by: PSYCHIATRY & NEUROLOGY

## 2019-02-02 PROCEDURE — 99233 PR SUBSEQUENT HOSPITAL CARE,LEVL III: ICD-10-PCS | Mod: ,,, | Performed by: PSYCHIATRY & NEUROLOGY

## 2019-02-02 PROCEDURE — 99233 SBSQ HOSP IP/OBS HIGH 50: CPT | Mod: ,,, | Performed by: PSYCHIATRY & NEUROLOGY

## 2019-02-02 PROCEDURE — 11400000 HC PSYCH PRIVATE ROOM

## 2019-02-02 PROCEDURE — 90833 PSYTX W PT W E/M 30 MIN: CPT | Mod: ,,, | Performed by: PSYCHIATRY & NEUROLOGY

## 2019-02-02 PROCEDURE — 90833 PR PSYCHOTHERAPY W/PATIENT W/E&M, 30 MIN (ADD ON): ICD-10-PCS | Mod: ,,, | Performed by: PSYCHIATRY & NEUROLOGY

## 2019-02-02 RX ORDER — CLOPIDOGREL BISULFATE 75 MG/1
75 TABLET ORAL DAILY
Status: DISCONTINUED | OUTPATIENT
Start: 2019-02-02 | End: 2019-02-03 | Stop reason: HOSPADM

## 2019-02-02 RX ADMIN — PRAZOSIN HYDROCHLORIDE 3 MG: 1 CAPSULE ORAL at 08:02

## 2019-02-02 RX ADMIN — OSELTAMIVIR PHOSPHATE 75 MG: 75 CAPSULE ORAL at 08:02

## 2019-02-02 RX ADMIN — FOLIC ACID 1 MG: 1 TABLET ORAL at 08:02

## 2019-02-02 RX ADMIN — METOPROLOL SUCCINATE 100 MG: 25 TABLET, EXTENDED RELEASE ORAL at 08:02

## 2019-02-02 RX ADMIN — ACETAMINOPHEN 650 MG: 325 TABLET ORAL at 08:02

## 2019-02-02 RX ADMIN — SERTRALINE HYDROCHLORIDE 100 MG: 100 TABLET ORAL at 08:02

## 2019-02-02 RX ADMIN — CLOPIDOGREL BISULFATE 75 MG: 75 TABLET ORAL at 11:02

## 2019-02-02 RX ADMIN — HYDROCHLOROTHIAZIDE 25 MG: 25 TABLET ORAL at 08:02

## 2019-02-02 RX ADMIN — GEMFIBROZIL 600 MG: 600 TABLET ORAL at 06:02

## 2019-02-02 RX ADMIN — GABAPENTIN 400 MG: 400 CAPSULE ORAL at 08:02

## 2019-02-02 RX ADMIN — METFORMIN HYDROCHLORIDE 500 MG: 500 TABLET ORAL at 07:02

## 2019-02-02 RX ADMIN — GEMFIBROZIL 600 MG: 600 TABLET ORAL at 04:02

## 2019-02-02 RX ADMIN — QUINAPRIL 20 MG: 10 TABLET ORAL at 08:02

## 2019-02-02 RX ADMIN — THERA TABS 1 TABLET: TAB at 08:02

## 2019-02-02 RX ADMIN — ASPIRIN 81 MG: 81 TABLET, COATED ORAL at 08:02

## 2019-02-02 RX ADMIN — QUETIAPINE FUMARATE 150 MG: 100 TABLET ORAL at 08:02

## 2019-02-02 RX ADMIN — ATORVASTATIN CALCIUM 40 MG: 20 TABLET, FILM COATED ORAL at 08:02

## 2019-02-02 RX ADMIN — OLANZAPINE 10 MG: 10 TABLET, FILM COATED ORAL at 08:02

## 2019-02-02 NOTE — PLAN OF CARE
Problem: Adult Behavioral Health Plan of Care  Goal: Plan of Care Review  Outcome: Ongoing (interventions implemented as appropriate)  Pt calm and cooperative, pacing, out on unit, appetite good, depressed, anxious, denies SI at this time, safety precautions maintained will continue to monitor

## 2019-02-02 NOTE — PLAN OF CARE
Problem: Adult Behavioral Health Plan of Care  Goal: Plan of Care Review  Outcome: Ongoing (interventions implemented as appropriate)  Pt still appears down and depressed.  Also irritated and upset, states he doesn't want to talk about it.  Pt given some prn zyprexa po for agitation per request.  Pt in dayroom sitting at table by himself.  Not interested in interacting with others.  Will monitor for effective med response.

## 2019-02-02 NOTE — PROGRESS NOTES
"PSYCHIATRY DAILY INPATIENT PROGRESS NOTE  SUBSEQUENT HOSPITAL VISIT    ENCOUNTER DATE: 2/2/2019  SITE: [facility]    DATE OF ADMISSION: 1/27/2019 12:46 PM  LENGTH OF STAY: 6 days      HISTORY    CHIEF COMPLAINT   Terrance Rodríguez is a 48 y.o. male, seen during daily chiu rounds on the inpatient unit.  Terrance Rodríguez presents with the chief complaint of depression and SI, "I was going to shoot myself."    HPI   (Elements: Location, Quality, Severity, Duration, Timing, Content, Modifying Factors, Associated Signs & Symptoms)    The patient was seen and examined. The chart was reviewed. Reviewed notes by the RNs, LPC, CTRS from the last 24 hours.      Staff reports no behavioral or management issues.     The patient has been compliant with treatment. The patient denied any side effects.    Patient no longer having issues with falls.  Discussed prazosin as a potential cause.  Suicidal ideation is resolved.  He is future oriented toward iop on Monday.  Last night he had a difficult conversation with his wife.  This continued to bother him into the morning where he felt agitated.  This agitation subsided after taking a zyprexa.  We talked about better coping and not needing a strong antipsychotic, patient agreed to try vistaril if needed.       Improving Symptoms of Depression: less diminished mood or loss of interest/anhedonia; less irritability, less diminished energy, less change in sleep, no change in appetite, less diminished concentration or cognition or indecisiveness, no PMA/R, less excessive guilt or hopelessness or worthlessness, no suicidal ideations     Improvng Changes in Sleep: less trouble with initiation/maintenance, no early morning awakening with inability to return to sleep; slept 8.5 hours last night     Resolved Suicidal/(no)Homicidal ideations: no active/passive ideations, no organized plans (no longer wantsshoot self), no future intentions; he is more future oriented and hopeful.      No " psychosis or juani     Improved Symptoms of EAMON: less excessive anxiety/worry/fear, with less restlessness, less fatigue, less poor concentration, less irritability, less muscle tension, less sleep disturbance     No current Symptoms of Panic Disorder: no panic attacks since admission; without agoraphobia     Continued but lessening Symptoms of PTSD: +h/o trauma; less re-experiencing/intrusive symptoms, less avoidant behavior, less negative alterations in cognition or mood, less hyperarousal symptoms; without dissociative symptoms       PSYCHOTHERAPY ADD-ON +[id number]   30 (16-37*) minutes    Time: 16 minutes  Participants: Met with patient    Therapeutic Intervention Type: behavior modifying psychotherapy, supportive psychotherapy  Why chosen therapy is appropriate versus another modality: relevant to diagnosis, patient responds to this modality, evidence based practice    Target symptoms: depression, anxiety   Primary focus: depression, psychosocial stressors, trauma  Psychotherapeutic techniques:Supportive psychoeducation    Outcome monitoring methods: self-report, observation    Patient's response to intervention:  The patient's response to intervention is accepting.    Progress toward goals:  The patient's progress toward goals is fair .    Discussed five love languages and coping      ROS  General ROS: negative  Ophthalmic ROS: negative  ENT ROS: negative  Allergy and Immunology ROS: negative  Hematological and Lymphatic ROS: negative  Endocrine ROS: negative  Respiratory ROS: no cough, shortness of breath, or wheezing  Cardiovascular ROS: no chest pain or dyspnea on exertion  Gastrointestinal ROS: no abdominal pain, change in bowel habits, or black or bloody stools  Genito-Urinary ROS: no dysuria, trouble voiding, or hematuria  Musculoskeletal ROS: negative  Neurological ROS: no TIA or stroke symptoms  Dermatological ROS: negative    PAST MEDICAL HISTORY   Past Medical History:   Diagnosis Date    Anxiety      "Depression     Diabetes mellitus     Diabetes mellitus, type 2     Headache     High cholesterol     Hx of psychiatric care     Hypertension     Insomnia     MI (myocardial infarction)     Panic disorder     Psychiatric problem     PTSD (post-traumatic stress disorder)     Sleep difficulties     Status post fracture of femur     Substance abuse     Suicide attempt     Therapy            PSYCHOTROPIC MEDICATIONS   Scheduled Meds:   aspirin  81 mg Oral Daily    atorvastatin  40 mg Oral Daily    clopidogrel  75 mg Oral Daily    folic acid  1 mg Oral Daily    gabapentin  400 mg Oral BID    gemfibrozil  600 mg Oral BID AC    hydroCHLOROthiazide  25 mg Oral Daily    metFORMIN  500 mg Oral Daily with breakfast    metoprolol succinate  100 mg Oral Daily    multivitamin  1 tablet Oral Daily    oseltamivir  75 mg Oral Daily    prazosin  3 mg Oral BID    QUEtiapine  150 mg Oral QHS    quinapril  20 mg Oral QHS    sertraline  100 mg Oral Daily     Continuous Infusions:  PRN Meds:.acetaminophen, aluminum-magnesium hydroxide-simethicone, dextrose 50%, dextrose 50%, docusate sodium, glucagon (human recombinant), glucose, glucose, hydrOXYzine pamoate, insulin aspart U-100, loperamide, nitroGLYCERIN, OLANZapine **AND** OLANZapine        EXAMINATION    VITALS   Vitals:    02/02/19 0817   BP: 125/82   Pulse: 74   Resp: 18   Temp: 98.4 °F (36.9 °C)     Body mass index is 37.56 kg/m².      CONSTITUTIONAL  General Appearance: WM, in casual garb, obese; NAD     MUSCULOSKELETAL  Muscle Strength and Tone:  normal  Abnormal Involuntary Movements:  none  Gait and Station:  non-ataxic but mild limp secondary to leg pain     PSYCHIATRIC   Level of Consciousness: awake, alert  Orientation: p/p/t/s  Grooming:  adequate to circumstances  Psychomotor Behavior: no PMA/R  Speech: nl r/t/v/s  Language:  English fluent  Mood: "kind of down"  Affect: somewhat dysphoric  Thought Process:  linear and organized  Associations: "  intact; no AVERY  Thought Content:  denied AVH/delusions; denied HI, no SI  Memory:  intact to recent and remote events  Attention:  intact to conversation; not distractible   Fund of Knowledge:  age and education appropriate  Estimate if Intelligence:  average based on work/education history, vocabulary and mental status exam  Insight:  good- recognizes illness and need for tx/help  Judgment:  good- no bx issues, compliant and cooperative       DIAGNOSTIC TESTING   Laboratory Results  Recent Results (from the past 24 hour(s))   POCT glucose    Collection Time: 02/01/19  4:17 PM   Result Value Ref Range    POCT Glucose 145 (H) 70 - 110 mg/dL   POCT glucose    Collection Time: 02/02/19  8:17 AM   Result Value Ref Range    POCT Glucose 212 (H) 70 - 110 mg/dL         ASSESSMENT      IMPRESSION   MDD, recurrent, severe without psychotic features  EAMON  PTSD  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  Pain Disorder with physiological and psychological factors     Psychosocial stressors     NIDDM  Essential HTN  HLD  CAD        MEDICAL DECISION MAKING          PROBLEM LIST AND MANAGEMENT PLANS;PRESCRIPTION DRUG MANAGEMENT  Compliance: yes  Side Effects: possible sweats from ssri  Regimen Adjustments:     Depression/Anxiety: Stop home meds of cymbalta (pt reports no efficacy); Continue Adjunctive Seroquel to 150 mg po q HS (off-label); increased zoloft to 100 mg po q day      PTSD: zoloft; seroquel off-label; Increased prazosin to 3 mg po BID     Insomnia: seroquel off-label     Pain: Increased gabapentin to 400 mg po BID     Psychosocial stressors: pt counseled; SW assisting with resources; Five love languages recommended    NIDDM: continue home med of Metformin 500 mg po q day; FM consulted     Essential HTN: Continue home med of HCTZ 25 mg po q day, Metoprolol  mg po q day, quinapril 20 mg po q night; ; FM consulted     HLD: continue home med of Lipitor 40 mg po q day , gemfibrozil 600 mg po BID; FM consulted     CAD:  continue Aspirin 81 mg po q day; nitro prn; FM consulted   restarted Plavix home dose     DIAGNOSTIC TESTING  Labs reviewed; follow up pending labs     Disposition:  -SW to assist with aftercare planning and collateral  -Once stable discharge home with outpatient follow up care and IOP  -Continue inpatient treatment under a PEC and/or [facility] for danger to self as evident by depression with SI requiring inpatient psychiatric stabilization. If patient continues to improve, will plan to discharge home on Sunday and start the IOP on Monday.       NEED FOR CONTINUED HOSPITALIZATION  Psychiatric illness continues to pose a potential threat to life or bodily function, of self or others, thereby requiring the need for continued inpatient psychiatric hospitalization: Yes    Protective inpatient pyschiatric hospitalization required while a safe disposition plan is enacted: Yes    Patient stabilized and ready for discharge from inpatient psychiatric unit: No      STAFF:   Alex Day MD  Psychiatry

## 2019-02-03 VITALS
HEART RATE: 65 BPM | HEIGHT: 71 IN | TEMPERATURE: 99 F | SYSTOLIC BLOOD PRESSURE: 126 MMHG | BODY MASS INDEX: 37 KG/M2 | RESPIRATION RATE: 18 BRPM | WEIGHT: 264.31 LBS | DIASTOLIC BLOOD PRESSURE: 79 MMHG

## 2019-02-03 LAB — POCT GLUCOSE: 139 MG/DL (ref 70–110)

## 2019-02-03 PROCEDURE — 25000003 PHARM REV CODE 250: Performed by: PSYCHIATRY & NEUROLOGY

## 2019-02-03 PROCEDURE — 99239 HOSP IP/OBS DSCHRG MGMT >30: CPT | Mod: ,,, | Performed by: PSYCHIATRY & NEUROLOGY

## 2019-02-03 PROCEDURE — 99239 PR HOSPITAL DISCHARGE DAY,>30 MIN: ICD-10-PCS | Mod: ,,, | Performed by: PSYCHIATRY & NEUROLOGY

## 2019-02-03 RX ORDER — SERTRALINE HYDROCHLORIDE 100 MG/1
100 TABLET, FILM COATED ORAL DAILY
Qty: 30 TABLET | Refills: 1 | Status: SHIPPED | OUTPATIENT
Start: 2019-02-04 | End: 2020-09-15

## 2019-02-03 RX ORDER — ASPIRIN 81 MG/1
81 TABLET ORAL DAILY
Refills: 0 | COMMUNITY
Start: 2019-02-04 | End: 2020-09-15

## 2019-02-03 RX ORDER — METFORMIN HYDROCHLORIDE 500 MG/1
500 TABLET ORAL
Qty: 30 TABLET | Refills: 1 | Status: SHIPPED | OUTPATIENT
Start: 2019-02-04 | End: 2020-09-15

## 2019-02-03 RX ORDER — GEMFIBROZIL 600 MG/1
600 TABLET, FILM COATED ORAL
Qty: 60 TABLET | Refills: 1 | Status: SHIPPED | OUTPATIENT
Start: 2019-02-03 | End: 2020-09-15

## 2019-02-03 RX ORDER — QUINAPRIL 20 MG/1
20 TABLET ORAL NIGHTLY
Qty: 30 TABLET | Refills: 1 | Status: SHIPPED | OUTPATIENT
Start: 2019-02-03 | End: 2020-09-15

## 2019-02-03 RX ORDER — ATORVASTATIN CALCIUM 40 MG/1
40 TABLET, FILM COATED ORAL DAILY
Qty: 30 TABLET | Refills: 1 | Status: SHIPPED | OUTPATIENT
Start: 2019-02-04 | End: 2020-09-15

## 2019-02-03 RX ORDER — GABAPENTIN 400 MG/1
400 CAPSULE ORAL 2 TIMES DAILY
Qty: 60 CAPSULE | Refills: 1 | Status: SHIPPED | OUTPATIENT
Start: 2019-02-03 | End: 2020-09-15

## 2019-02-03 RX ORDER — METOPROLOL SUCCINATE 100 MG/1
100 TABLET, EXTENDED RELEASE ORAL DAILY
Qty: 30 TABLET | Refills: 1 | Status: SHIPPED | OUTPATIENT
Start: 2019-02-04 | End: 2020-09-15

## 2019-02-03 RX ORDER — CLOPIDOGREL BISULFATE 75 MG/1
75 TABLET ORAL DAILY
Qty: 30 TABLET | Refills: 1 | Status: SHIPPED | OUTPATIENT
Start: 2019-02-04 | End: 2020-09-15

## 2019-02-03 RX ORDER — QUETIAPINE FUMARATE 50 MG/1
150 TABLET, FILM COATED ORAL NIGHTLY
Qty: 90 TABLET | Refills: 1 | Status: SHIPPED | OUTPATIENT
Start: 2019-02-03 | End: 2020-09-15

## 2019-02-03 RX ORDER — HYDROCHLOROTHIAZIDE 25 MG/1
25 TABLET ORAL DAILY
Qty: 30 TABLET | Refills: 1 | Status: SHIPPED | OUTPATIENT
Start: 2019-02-04 | End: 2020-09-15

## 2019-02-03 RX ORDER — PRAZOSIN HYDROCHLORIDE 1 MG/1
3 CAPSULE ORAL 2 TIMES DAILY
Qty: 180 CAPSULE | Refills: 1 | Status: SHIPPED | OUTPATIENT
Start: 2019-02-03 | End: 2020-09-15

## 2019-02-03 RX ADMIN — FOLIC ACID 1 MG: 1 TABLET ORAL at 08:02

## 2019-02-03 RX ADMIN — ASPIRIN 81 MG: 81 TABLET, COATED ORAL at 08:02

## 2019-02-03 RX ADMIN — METFORMIN HYDROCHLORIDE 500 MG: 500 TABLET ORAL at 08:02

## 2019-02-03 RX ADMIN — ATORVASTATIN CALCIUM 40 MG: 20 TABLET, FILM COATED ORAL at 08:02

## 2019-02-03 RX ADMIN — SERTRALINE HYDROCHLORIDE 100 MG: 100 TABLET ORAL at 08:02

## 2019-02-03 RX ADMIN — HYDROCHLOROTHIAZIDE 25 MG: 25 TABLET ORAL at 08:02

## 2019-02-03 RX ADMIN — PRAZOSIN HYDROCHLORIDE 3 MG: 1 CAPSULE ORAL at 08:02

## 2019-02-03 RX ADMIN — METOPROLOL SUCCINATE 100 MG: 25 TABLET, EXTENDED RELEASE ORAL at 08:02

## 2019-02-03 RX ADMIN — THERA TABS 1 TABLET: TAB at 08:02

## 2019-02-03 RX ADMIN — OSELTAMIVIR PHOSPHATE 75 MG: 75 CAPSULE ORAL at 08:02

## 2019-02-03 RX ADMIN — CLOPIDOGREL BISULFATE 75 MG: 75 TABLET ORAL at 08:02

## 2019-02-03 RX ADMIN — GEMFIBROZIL 600 MG: 600 TABLET ORAL at 06:02

## 2019-02-03 RX ADMIN — GABAPENTIN 400 MG: 400 CAPSULE ORAL at 08:02
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
## 2019-02-03 NOTE — PLAN OF CARE
Problem: Adult Behavioral Health Plan of Care  Goal: Plan of Care Review  Outcome: Ongoing (interventions implemented as appropriate)  Plan of care reviewed.  Denies intent to harm self or others at this time.  Accepts snacks and medications.  Gait steady, no falls.  Interacting with staff and peers while watching TV.  Was reported that wife visited and visit went well.   Promoted an individualized safety plan, reality-based interactions, effective coping strategies, and impulse control.  Will continue to monitor for safety.         Problem: Cognitive Impairment (Depressive Signs/Symptoms)  Goal: Improved Ability to Think and Concentrate (Depressive Signs/Symptoms)    Intervention: Support and Promote Cognitive Ability  Accepting meds as ordered by MD. Pt out on the unit and interacting with peers and staff.

## 2019-02-03 NOTE — NURSING
Pt discharge arranged for today.  All belongings accounted for and will be returned upon discharge.  DC instructions and meds reviewed with pt, understanding verbalized.  Pt denies any S/i or H/i at this time.  Pt will be following up with [facility] at [address] [place], [place] [address] [id number].  [id number].  Appointment is on Monday Feb 4, 2019 at 9am.  Pt will receive a substance abuse therapy appointment at mentioned appointment due to a positive UDS.  AVS was faxed at [phone] on 2/3/19.  Pt will be leaving via private vehicle.

## 2019-02-05 ENCOUNTER — PATIENT OUTREACH (OUTPATIENT)
Dept: ADMINISTRATIVE | Facility: CLINIC | Age: 49
End: 2019-02-05

## 2019-02-05 NOTE — PATIENT INSTRUCTIONS
"Know the Signs and Symptoms of Depression  Everyone feels down at times. The blues are a natural part of life. But an unhappy period thats intense or lasts for more than a couple of weeks can be a sign of depression.  Depression is a serious illness. It is not a sign of weakness or a "character flaw," and it is not something you can "snap out of." In fact, most people with depression need treatment to get better. Depression can disrupt the lives of family and friends. If you know someone you think may be depressed, find out what you can do to help.    Recognizing signs of depression  People who are depressed may:  · Feel unhappy, sad, blue, down, or miserable nearly every day  · Feel helpless, hopeless, or worthless  · Lose interest in hobbies, friends, and activities that used to give pleasure  · Not sleep well or sleep too much  · Gain or lose weight  · Feel low on energy or constantly tired  · Have a hard time concentrating or making decisions  · Lose interest in sex  · Have physical symptoms, such as stomachaches, headaches, or backaches  Know the serious signals  Never ignore a person's comments about suicide or behaviors that can lead to self-harm. Warning signals for suicide include:  · Threats or talk of suicide  · Statements such as I wont be a problem much longer or Nothing matters  · Giving away possessions or making a will or  arrangements  · Buying a gun or other weapon  · Sudden, unexplained cheerfulness or calm after a period of depression  If you notice any of these signs, get help right away. Call a healthcare professional, mental health clinic, or suicide hotline and ask what action to take. In an emergency, dont hesitate to call the police.  Resources:  · National Institutes of Mental Oibkph464-378-8835qua.[facility] Hospitalh.nih.gov  · National [place] on Mental Ipwsmsz083-999-1338mcl.[place].org   · Mental Health Aldwmob232-377-9588lmy.nmha.org  · National Suicide Thuooze315-685-8802 " (800-SUICIDE)  · National Suicide Prevention Qnjarfaj985-197-7548 (169-997-JDSG)www.suicidepreventionlifeline.org   Date Last Reviewed: 1/1/2017  © 0987-2512 [employer]. [address] [place] [address], [place] [address]. All rights reserved. This information is not intended as a substitute for professional medical care. Always follow your healthcare professional's instructions.

## 2019-02-05 NOTE — PROGRESS NOTES
[facility] Script completed with patient. Patient is emotional with recent separation from spouse. Encouraged to continue with [facility] and to know we are available if he needs assistance. Verbalizes understanding.    Patient reports 2 medications not on  Medication reconcillation: Jardiante 25 mg daily, Aspirin 81 mg daily.

## 2019-04-02 RX ORDER — GABAPENTIN 400 MG/1
CAPSULE ORAL
Qty: 60 CAPSULE | Refills: 0 | OUTPATIENT
Start: 2019-04-02

## 2019-04-10 RX ORDER — HYDROCHLOROTHIAZIDE 25 MG/1
TABLET ORAL
Qty: 30 TABLET | Refills: 0 | OUTPATIENT
Start: 2019-04-10

## 2019-06-19 RX ORDER — GEMFIBROZIL 600 MG/1
TABLET, FILM COATED ORAL
Qty: 60 TABLET | Refills: 0 | OUTPATIENT
Start: 2019-06-19

## 2020-09-15 PROBLEM — R07.9 CHEST PAIN: Status: ACTIVE | Noted: 2020-09-15

## 2020-09-16 PROBLEM — R07.9 CHEST PAIN: Status: RESOLVED | Noted: 2020-09-15 | Resolved: 2020-09-16
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
## 2021-05-06 ENCOUNTER — PATIENT MESSAGE (OUTPATIENT)
Dept: RESEARCH | Facility: HOSPITAL | Age: 51
End: 2021-05-06

## 2021-05-10 ENCOUNTER — PATIENT MESSAGE (OUTPATIENT)
Dept: RESEARCH | Facility: HOSPITAL | Age: 51
End: 2021-05-10